# Patient Record
Sex: FEMALE | Race: ASIAN | HISPANIC OR LATINO | Employment: UNEMPLOYED | URBAN - METROPOLITAN AREA
[De-identification: names, ages, dates, MRNs, and addresses within clinical notes are randomized per-mention and may not be internally consistent; named-entity substitution may affect disease eponyms.]

---

## 2019-08-20 ENCOUNTER — OCCUPATIONAL HEALTH (OUTPATIENT)
Dept: URGENT CARE | Facility: CLINIC | Age: 29
End: 2019-08-20
Payer: MEDICAID

## 2019-08-20 DIAGNOSIS — Z02.83 ENCOUNTER FOR DRUG SCREENING: Primary | ICD-10-CM

## 2019-08-20 PROCEDURE — 80305 OOH COLLECTION ONLY DRUG SCREEN: ICD-10-PCS | Mod: S$GLB,,, | Performed by: PREVENTIVE MEDICINE

## 2019-08-20 PROCEDURE — 80305 DRUG TEST PRSMV DIR OPT OBS: CPT | Mod: S$GLB,,, | Performed by: PREVENTIVE MEDICINE

## 2020-01-22 ENCOUNTER — OFFICE VISIT (OUTPATIENT)
Dept: URGENT CARE | Facility: CLINIC | Age: 30
End: 2020-01-22
Payer: MEDICAID

## 2020-01-22 VITALS
SYSTOLIC BLOOD PRESSURE: 118 MMHG | HEIGHT: 60 IN | TEMPERATURE: 99 F | RESPIRATION RATE: 16 BRPM | HEART RATE: 104 BPM | DIASTOLIC BLOOD PRESSURE: 72 MMHG | BODY MASS INDEX: 27.48 KG/M2 | OXYGEN SATURATION: 98 % | WEIGHT: 140 LBS

## 2020-01-22 DIAGNOSIS — N30.00 ACUTE CYSTITIS WITHOUT HEMATURIA: ICD-10-CM

## 2020-01-22 DIAGNOSIS — R30.0 DYSURIA: Primary | ICD-10-CM

## 2020-01-22 LAB
B-HCG UR QL: NEGATIVE
BILIRUB UR QL STRIP: NEGATIVE
CTP QC/QA: YES
GLUCOSE UR QL STRIP: NEGATIVE
KETONES UR QL STRIP: NEGATIVE
LEUKOCYTE ESTERASE UR QL STRIP: POSITIVE
PH, POC UA: 8 (ref 5–8)
POC BLOOD, URINE: POSITIVE
POC NITRATES, URINE: NEGATIVE
PROT UR QL STRIP: NEGATIVE
SP GR UR STRIP: 1.01 (ref 1–1.03)
UROBILINOGEN UR STRIP-ACNC: NORMAL (ref 0.1–1.1)

## 2020-01-22 PROCEDURE — 99214 OFFICE O/P EST MOD 30 MIN: CPT | Mod: 25,S$GLB,, | Performed by: FAMILY MEDICINE

## 2020-01-22 PROCEDURE — 81003 URINALYSIS AUTO W/O SCOPE: CPT | Mod: QW,S$GLB,, | Performed by: FAMILY MEDICINE

## 2020-01-22 PROCEDURE — 81025 URINE PREGNANCY TEST: CPT | Mod: S$GLB,,, | Performed by: FAMILY MEDICINE

## 2020-01-22 PROCEDURE — 87086 URINE CULTURE/COLONY COUNT: CPT

## 2020-01-22 PROCEDURE — 81025 POCT URINE PREGNANCY: ICD-10-PCS | Mod: S$GLB,,, | Performed by: FAMILY MEDICINE

## 2020-01-22 PROCEDURE — 87186 SC STD MICRODIL/AGAR DIL: CPT

## 2020-01-22 PROCEDURE — 99214 PR OFFICE/OUTPT VISIT, EST, LEVL IV, 30-39 MIN: ICD-10-PCS | Mod: 25,S$GLB,, | Performed by: FAMILY MEDICINE

## 2020-01-22 PROCEDURE — 87077 CULTURE AEROBIC IDENTIFY: CPT

## 2020-01-22 PROCEDURE — 81003 POCT URINALYSIS, DIPSTICK, AUTOMATED, W/O SCOPE: ICD-10-PCS | Mod: QW,S$GLB,, | Performed by: FAMILY MEDICINE

## 2020-01-22 PROCEDURE — 87088 URINE BACTERIA CULTURE: CPT

## 2020-01-22 RX ORDER — CIPROFLOXACIN 500 MG/1
500 TABLET ORAL 2 TIMES DAILY
Qty: 14 TABLET | Refills: 0 | Status: SHIPPED | OUTPATIENT
Start: 2020-01-22 | End: 2020-01-29

## 2020-01-22 NOTE — PATIENT INSTRUCTIONS
PLEASE READ YOUR DISCHARGE INSTRUCTIONS ENTIRELY AS IT CONTAINS IMPORTANT INFORMATION.    Please return here or go to the Emergency Department for any concerns or worsening of condition.    If you were prescribed antibiotics, please take them to completion.      If not allergic, please take over the counter Tylenol (Acetaminophen) and/or Motrin (Ibuprofen) as directed for control of pain and/or fever.  Please follow up with your primary care doctor or specialist as needed.  Soak wound as discussed and apply warm compresses frequently    If you smoke, please stop smoking.    Please return or see your primary care doctor if you develop new or worsening symptoms.     Please arrange follow up with your primary medical clinic as soon as possible. You must understand that you've received an Urgent Care treatment only and that you may be released before all of your medical problems are known or treated. You, the patient, will arrange for follow up as instructed. If your symptoms worsen or fail to improve you should go to the Emergency Room.  Understanding Urinary Tract Infections (UTIs)  Most UTIs are caused by bacteria, although they may also be caused by viruses or fungi. Bacteria from the bowel are the most common source of infection. The infection may start because of any of the following:  · Sexual activity. During sex, bacteria can travel from the penis, vagina, or rectum into the urethra.   · Bacteria on the skin outside the rectum may travel into the urethra. This is more common in women since the rectum and urethra are closer to each other than in men. Wiping from front to back after using the toilet and keeping the area clean can help prevent germs from getting to the urethra.  · Blockage of urine flow through the urinary tract. If urine sits too long, germs may start to grow out of control.      Parts of the urinary tract  The infection can occur in any part of the urinary tract.  · The kidneys collect and store  "urine.  · The ureters carry urine from the kidneys to the bladder.  · The bladder holds urine until you are ready to let it out.  · The urethra carries urine from the bladder out of the body. It is shorter in women, so bacteria can move through it more easily. The urethra is longer in men, so a UTI is less likely to reach the bladder or kidneys in men.  Date Last Reviewed: 1/1/2017 © 2000-2017 [employer]. [address] [place] [address], [place] [address]. All rights reserved. This information is not intended as a substitute for professional medical care. Always follow your healthcare professional's instructions.        Dysuria     Painful urination (dysuria) is often caused by a problem in the urinary tract.   Dysuria is pain felt during urination. It is often described as a burning. Learn more about this problem and how it can be treated.  What causes dysuria?  Possible causes include:  · Infection with a bacteria or virus such as a urinary tract infection (UTI or a sexually transmitted infection (STI)  · Sensitivity or allergy to chemicals such as those found in lotions and other products  · Prostate or bladder problems  · Radiation therapy to the pelvic area  How is dysuria diagnosed?  Your healthcare provider will examine you. He or she will ask about your symptoms and health. After talking with you and doing a physical exam, your healthcare provider may know what is causing your dysuria. He or she will usually request  a sample of your urine. Tests of your urine, or a "urinalysis," are done. A urinalysis may include:  · Looking at the urine sample (visual exam)  · Checking for substances (chemical exam)  · Looking at a small amount under a microscope (microscopic exam)  Some parts of the urinalysis may be done in the provider's office and some in a lab. And, the urine sample may be checked for bacteria and yeast (urine culture). Your healthcare provider will tell you more about these tests if they are " needed.  How is dysuria treated?  Treatment depends on the cause. If you have a bacterial infection, you may need antibiotics. You may be given medicines to make it easier for you to urinate and help relieve pain. Your healthcare provider can tell you more about your treatment options. Untreated, symptoms may get worse.  When to call your healthcare provider  Call the healthcare provider right away if you have any of the following:  · Fever of 100.4°F (38°C) or higher   · No improvement after three days of treatment  · Trouble urinating because of pain  · New or increased discharge from the vagina or penis  · Rash or joint pain  · Increased back or abdominal pain  · Enlarged painful lymph nodes (lumps) in the groin   Date Last Reviewed: 1/1/2017  © 9257-1779 [employer]. [address] [place] [address], [place] [address]. All rights reserved. This information is not intended as a substitute for professional medical care. Always follow your healthcare professional's instructions.

## 2020-01-22 NOTE — PROGRESS NOTES
Subjective:       Patient ID: Ermelinda Trivedi is a 29 y.o. female.    Vitals:  height is 5' (1.524 m) and weight is 63.5 kg (140 lb). Her temperature is 98.8 °F (37.1 °C). Her blood pressure is 118/72 and her pulse is 104. Her respiration is 16 and oxygen saturation is 98%.     Chief Complaint: Urinary Tract Infection    29 years old female came with complaint of dysuria,  increased frequency and urgency for 1 day.  Denies fever, chills, abdominal pain, nausea/vomiting.    Urinary Tract Infection    This is a new problem. The current episode started yesterday. The problem occurs every urination. The problem has been unchanged. The quality of the pain is described as burning. The pain is at a severity of 2/10. The pain is mild. She is sexually active. There is no history of pyelonephritis. Associated symptoms include urgency. Pertinent negatives include no chills, frequency, hematuria, nausea, vomiting or rash. She has tried nothing for the symptoms.       Constitution: Negative for chills and fever.   Neck: Negative for painful lymph nodes.   Gastrointestinal: Negative for abdominal pain, nausea and vomiting.   Genitourinary: Positive for dysuria and urgency. Negative for frequency, urine decreased, hematuria, history of kidney stones, painful menstruation, irregular menstruation, missed menses, heavy menstrual bleeding, ovarian cysts, genital trauma, vaginal pain, vaginal discharge, vaginal bleeding, vaginal odor, painful intercourse, genital sore, painful ejaculation and pelvic pain.   Musculoskeletal: Negative for back pain.   Skin: Negative for rash and lesion.   Hematologic/Lymphatic: Negative for swollen lymph nodes.       Objective:      Physical Exam   Constitutional: She is oriented to person, place, and time. She appears well-developed and well-nourished.   HENT:   Head: Normocephalic and atraumatic.   Right Ear: External ear normal.   Left Ear: External ear normal.   Nose: Nose normal.   Mouth/Throat:  Mucous membranes are normal.   Eyes: Conjunctivae and lids are normal.   Neck: Trachea normal and full passive range of motion without pain. Neck supple.   Cardiovascular: Normal rate, regular rhythm and normal heart sounds.   Pulmonary/Chest: Effort normal and breath sounds normal. No stridor. No respiratory distress. She has no wheezes. She has no rales.   Abdominal: Soft. Normal appearance and bowel sounds are normal. She exhibits no distension, no abdominal bruit, no pulsatile midline mass and no mass. There is no tenderness. There is no rebound and no guarding.   No suprapubic or CVA tenderness.   Musculoskeletal: Normal range of motion. She exhibits no edema.   Neurological: She is alert and oriented to person, place, and time. She has normal strength.   Skin: Skin is warm, dry, intact, not diaphoretic and not pale.   Psychiatric: She has a normal mood and affect. Her speech is normal and behavior is normal. Judgment and thought content normal. Cognition and memory are normal.   Nursing note and vitals reviewed.        Assessment:       1. Dysuria        Plan:         Dysuria  -     POCT Urinalysis, Dipstick, Automated, W/O Scope  -     POCT urine pregnancy    PLEASE READ YOUR DISCHARGE INSTRUCTIONS ENTIRELY AS IT CONTAINS IMPORTANT INFORMATION.    Please return here or go to the Emergency Department for any concerns or worsening of condition.    If you were prescribed antibiotics, please take them to completion.      If not allergic, please take over the counter Tylenol (Acetaminophen) and/or Motrin (Ibuprofen) as directed for control of pain and/or fever.  Please follow up with your primary care doctor or specialist as needed.  Soak wound as discussed and apply warm compresses frequently    If you smoke, please stop smoking.    Please return or see your primary care doctor if you develop new or worsening symptoms.     Please arrange follow up with your primary medical clinic as soon as possible. You must  understand that you've received an Urgent Care treatment only and that you may be released before all of your medical problems are known or treated. You, the patient, will arrange for follow up as instructed. If your symptoms worsen or fail to improve you should go to the Emergency Room.  Understanding Urinary Tract Infections (UTIs)  Most UTIs are caused by bacteria, although they may also be caused by viruses or fungi. Bacteria from the bowel are the most common source of infection. The infection may start because of any of the following:  · Sexual activity. During sex, bacteria can travel from the penis, vagina, or rectum into the urethra.   · Bacteria on the skin outside the rectum may travel into the urethra. This is more common in women since the rectum and urethra are closer to each other than in men. Wiping from front to back after using the toilet and keeping the area clean can help prevent germs from getting to the urethra.  · Blockage of urine flow through the urinary tract. If urine sits too long, germs may start to grow out of control.      Parts of the urinary tract  The infection can occur in any part of the urinary tract.  · The kidneys collect and store urine.  · The ureters carry urine from the kidneys to the bladder.  · The bladder holds urine until you are ready to let it out.  · The urethra carries urine from the bladder out of the body. It is shorter in women, so bacteria can move through it more easily. The urethra is longer in men, so a UTI is less likely to reach the bladder or kidneys in men.  Date Last Reviewed: 1/1/2017  © 4247-2922 [employer]. [address] [place] [address], [place] [address]. All rights reserved. This information is not intended as a substitute for professional medical care. Always follow your healthcare professional's instructions.        Dysuria     Painful urination (dysuria) is often caused by a problem in the urinary tract.   Dysuria is pain felt during  "urination. It is often described as a burning. Learn more about this problem and how it can be treated.  What causes dysuria?  Possible causes include:  · Infection with a bacteria or virus such as a urinary tract infection (UTI or a sexually transmitted infection (STI)  · Sensitivity or allergy to chemicals such as those found in lotions and other products  · Prostate or bladder problems  · Radiation therapy to the pelvic area  How is dysuria diagnosed?  Your healthcare provider will examine you. He or she will ask about your symptoms and health. After talking with you and doing a physical exam, your healthcare provider may know what is causing your dysuria. He or she will usually request  a sample of your urine. Tests of your urine, or a "urinalysis," are done. A urinalysis may include:  · Looking at the urine sample (visual exam)  · Checking for substances (chemical exam)  · Looking at a small amount under a microscope (microscopic exam)  Some parts of the urinalysis may be done in the provider's office and some in a lab. And, the urine sample may be checked for bacteria and yeast (urine culture). Your healthcare provider will tell you more about these tests if they are needed.  How is dysuria treated?  Treatment depends on the cause. If you have a bacterial infection, you may need antibiotics. You may be given medicines to make it easier for you to urinate and help relieve pain. Your healthcare provider can tell you more about your treatment options. Untreated, symptoms may get worse.  When to call your healthcare provider  Call the healthcare provider right away if you have any of the following:  · Fever of 100.4°F (38°C) or higher   · No improvement after three days of treatment  · Trouble urinating because of pain  · New or increased discharge from the vagina or penis  · Rash or joint pain  · Increased back or abdominal pain  · Enlarged painful lymph nodes (lumps) in the groin   Date Last Reviewed: " 1/1/2017  © 9789-9915 [employer] [address] [place] [address], [place] [address]. All rights reserved. This information is not intended as a substitute for professional medical care. Always follow your healthcare professional's instructions.

## 2020-01-25 ENCOUNTER — TELEPHONE (OUTPATIENT)
Dept: URGENT CARE | Facility: CLINIC | Age: 30
End: 2020-01-25

## 2020-01-25 LAB — BACTERIA UR CULT: ABNORMAL

## 2020-01-25 NOTE — TELEPHONE ENCOUNTER
Called to discuss positive urine culture results of E.coli, treated appropriately with Cipro.  No answer, left VM.

## 2020-04-21 DIAGNOSIS — Z01.84 ANTIBODY RESPONSE EXAMINATION: ICD-10-CM

## 2020-05-21 DIAGNOSIS — Z01.84 ANTIBODY RESPONSE EXAMINATION: ICD-10-CM

## 2020-06-20 DIAGNOSIS — Z01.84 ANTIBODY RESPONSE EXAMINATION: ICD-10-CM

## 2020-07-20 DIAGNOSIS — Z01.84 ANTIBODY RESPONSE EXAMINATION: ICD-10-CM

## 2020-08-19 DIAGNOSIS — Z01.84 ANTIBODY RESPONSE EXAMINATION: ICD-10-CM

## 2020-09-18 DIAGNOSIS — Z01.84 ANTIBODY RESPONSE EXAMINATION: ICD-10-CM

## 2020-10-18 DIAGNOSIS — Z01.84 ANTIBODY RESPONSE EXAMINATION: ICD-10-CM

## 2020-10-19 ENCOUNTER — CLINICAL SUPPORT (OUTPATIENT)
Dept: URGENT CARE | Facility: CLINIC | Age: 30
End: 2020-10-19
Payer: MEDICAID

## 2020-10-19 DIAGNOSIS — Z23 IMMUNIZATION DUE: Primary | ICD-10-CM

## 2020-11-17 DIAGNOSIS — Z01.84 ANTIBODY RESPONSE EXAMINATION: ICD-10-CM

## 2022-04-05 ENCOUNTER — OFFICE VISIT (OUTPATIENT)
Dept: URGENT CARE | Facility: CLINIC | Age: 32
End: 2022-04-05
Payer: COMMERCIAL

## 2022-04-05 VITALS
RESPIRATION RATE: 16 BRPM | BODY MASS INDEX: 33.17 KG/M2 | OXYGEN SATURATION: 97 % | HEART RATE: 93 BPM | HEIGHT: 58 IN | SYSTOLIC BLOOD PRESSURE: 130 MMHG | WEIGHT: 158 LBS | TEMPERATURE: 99 F | DIASTOLIC BLOOD PRESSURE: 76 MMHG

## 2022-04-05 DIAGNOSIS — L23.5 CHEMICAL INDUCED ALLERGIC CONTACT DERMATITIS: Primary | ICD-10-CM

## 2022-04-05 LAB
CTP QC/QA: YES
POC 10 PANEL DRUG SCREEN: NEGATIVE

## 2022-04-05 PROCEDURE — 80305 POCT RAPID DRUG SCREEN 10 PANEL: ICD-10-PCS | Mod: S$GLB,,, | Performed by: EMERGENCY MEDICINE

## 2022-04-05 PROCEDURE — 99213 PR OFFICE/OUTPT VISIT, EST, LEVL III, 20-29 MIN: ICD-10-PCS | Mod: S$GLB,,, | Performed by: EMERGENCY MEDICINE

## 2022-04-05 PROCEDURE — 99213 OFFICE O/P EST LOW 20 MIN: CPT | Mod: S$GLB,,, | Performed by: EMERGENCY MEDICINE

## 2022-04-05 PROCEDURE — 80305 DRUG TEST PRSMV DIR OPT OBS: CPT | Mod: S$GLB,,, | Performed by: EMERGENCY MEDICINE

## 2022-04-05 RX ORDER — COVID-19 MOLECULAR TEST ASSAY
KIT MISCELLANEOUS
COMMUNITY
Start: 2022-03-07

## 2022-04-05 RX ORDER — HYDROCORTISONE 25 MG/G
CREAM TOPICAL 2 TIMES DAILY
Qty: 28 G | Refills: 0 | Status: SHIPPED | OUTPATIENT
Start: 2022-04-05 | End: 2022-04-12

## 2022-04-05 NOTE — LETTER
[facility]  [address] [address]  Phone: [phone]  Fax: [phone]  [employer]: [phone]    Pt Name: Ermelinda Soto Date: 04/05/2022   Employee ID: [id number] Date of First Treatment: 04/05/2022   Company: [employer]      Appointment Time: 01:20 PM Arrived: 1:33pm   Provider: Arnaldo Tim MD Time Out: 3:15pm     Office Treatment:   1. Chemical induced allergic contact dermatitis      Medications Ordered This Encounter   Medications    hydrocortisone 2.5 % cream      Patient Instructions: Attention not to aggravate affected area      Restrictions: Regular Duty, Discharged from Occupational Health     Return if symptoms worsen or fail to improve.

## 2022-04-05 NOTE — PROGRESS NOTES
Subjective:       Patient ID: Ermelinda Trivedi is a 31 y.o. female.    Chief Complaint: Chemical Exposure (LT Hand)    [facility] Chemical Exposure to LT Hand ( DOI 04-05-22 ) While at work, she was excepting a tissue sample being put into a Formalin jar when formalin splashed onto her LT Hand. Pain score is 0/10 with No complaints, LT Hand was Itchy for awhile but isn't now, No swelling, No redness. 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  She was not wearing gloves at the time.  She reports washing her hands afterwards and then feeling a itchy sensation to the left hand.  She also reported very mild redness.  The symptoms resolved and she washed her hands again.  She reports the itchiness came back once.  No shortness of breath no hives no diffuse reaction.  For hours the symptoms have been resolved and she has full range of motion.  Physical exam shows no rash or irritation or skin breakdown.  I have instructed her to take Claritin as needed and hydrocortisone cream if the rash returns or itching sensation returns.  She may return p.r.n. however discharged from occupational health without restrictions to return to regular duty tomorrow.      ros    Constitution: Negative for chills, fatigue and fever.   HENT: Negative for ear pain, sinus pain and sore throat.    Neck: Negative for neck pain and neck stiffness.   Cardiovascular: Negative for chest pain, palpitations and sob on exertion.   Eyes: Negative for eye pain and vision loss.   Respiratory: Negative for cough, shortness of breath and asthma.    Gastrointestinal: Negative for abdominal pain, nausea, vomiting and diarrhea.   Genitourinary: Negative for dysuria, frequency and hematuria.   Musculoskeletal: Negative for pain, trauma, joint pain, joint swelling, abnormal ROM of joint, back pain and muscle ache.   Skin: Positive for rash.  Negative for color change, wound, erythema and bruising.   Allergic/Immunologic: Negative for seasonal allergies and asthma.   Neurological: Negative for dizziness, light-headedness, altered mental status, numbness and tingling.   Psychiatric/Behavioral: Negative for altered mental status and confusion.        Objective:      Physical Exam  Vitals and nursing note reviewed.   Constitutional:       Appearance: She is well-developed.   HENT:      Head: Normocephalic and atraumatic. No abrasion, contusion or laceration.      Right Ear: External ear normal.      Left Ear: External ear normal.      Nose: Nose normal.   Eyes:      General: Lids are normal.      Conjunctiva/sclera: Conjunctivae normal.      Pupils: Pupils are equal, round, and reactive to light.   Neck:      Trachea: Trachea and phonation normal.   Cardiovascular:      Rate and Rhythm: Normal rate and regular rhythm.      Heart sounds: Normal heart sounds.   Pulmonary:      Effort: Pulmonary effort is normal. No respiratory distress.      Breath sounds: Normal breath sounds. No stridor.   Musculoskeletal:         General: Normal range of motion.      Cervical back: Full passive range of motion without pain and neck supple.   Skin:     General: Skin is warm and dry.      Capillary Refill: Capillary refill takes less than 2 seconds.      Findings: No abrasion, bruising, burn, ecchymosis, erythema, laceration, lesion or rash.      Comments: Currently examination of the left hand shows no hives, no erythema no skin breakdown or evidence of dermatitis.  Consistent with mild chemical dermatitis that has resolved.  Normal range of motion and distally neurovascularly intact.   Neurological:      Mental Status: She is alert and oriented to person, place, and time.   Psychiatric:         Speech: Speech normal.         Behavior: Behavior normal.         Thought Content: Thought content normal.         Judgment: Judgment normal.         Assessment:       1. 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  She was not wearing gloves at the time.  She reports washing her hands afterwards and then feeling a itchy sensation to the left hand.  She also reported very mild redness.  The symptoms resolved and she washed her hands again.  She reports the itchiness came back once.  No shortness of breath no hives no diffuse reaction.  For hours the symptoms have been resolved and she has full range of motion.  Physical exam shows no rash or irritation or skin breakdown.  I have instructed her to take Claritin as needed and hydrocortisone cream if the rash returns or itching sensation returns.  She may return p.r.n. however discharged from occupational health without restrictions to return to regular duty tomorrow.         Patient Instructions: Attention not to aggravate affected area   Restrictions: Regular Duty, Discharged from Occupational Health  Follow up in about 1 year (around 4/5/2023), or if symptoms worsen or fail to improve.

## 2022-05-19 ENCOUNTER — HOSPITAL ENCOUNTER (EMERGENCY)
Facility: HOSPITAL | Age: 32
Discharge: HOME | End: 2022-05-19
Attending: INTERNAL MEDICINE
Payer: MEDICAID

## 2022-05-19 VITALS
DIASTOLIC BLOOD PRESSURE: 74 MMHG | SYSTOLIC BLOOD PRESSURE: 111 MMHG | HEART RATE: 91 BPM | BODY MASS INDEX: 33.17 KG/M2 | OXYGEN SATURATION: 96 % | TEMPERATURE: 99 F | HEIGHT: 58 IN | RESPIRATION RATE: 20 BRPM | WEIGHT: 158 LBS

## 2022-05-19 DIAGNOSIS — J06.9 VIRAL URI WITH COUGH: Primary | ICD-10-CM

## 2022-05-19 DIAGNOSIS — R00.0 RACING HEART BEAT: ICD-10-CM

## 2022-05-19 LAB
B-HCG UR QL: NEGATIVE
CTP QC/QA: YES
INFLUENZA A ANTIGEN, POC: NEGATIVE
INFLUENZA B ANTIGEN, POC: NEGATIVE

## 2022-05-19 PROCEDURE — 93010 ELECTROCARDIOGRAM REPORT: CPT | Mod: ,,, | Performed by: INTERNAL MEDICINE

## 2022-05-19 PROCEDURE — 99284 EMERGENCY DEPT VISIT MOD MDM: CPT | Mod: 25,ER

## 2022-05-19 PROCEDURE — U0005 INFEC AGEN DETEC AMPLI PROBE: HCPCS | Performed by: NURSE PRACTITIONER

## 2022-05-19 PROCEDURE — 93005 ELECTROCARDIOGRAM TRACING: CPT | Mod: ER

## 2022-05-19 PROCEDURE — U0003 INFECTIOUS AGENT DETECTION BY NUCLEIC ACID (DNA OR RNA); SEVERE ACUTE RESPIRATORY SYNDROME CORONAVIRUS 2 (SARS-COV-2) (CORONAVIRUS DISEASE [COVID-19]), AMPLIFIED PROBE TECHNIQUE, MAKING USE OF HIGH THROUGHPUT TECHNOLOGIES AS DESCRIBED BY CMS-2020-01-R: HCPCS | Performed by: NURSE PRACTITIONER

## 2022-05-19 PROCEDURE — 93010 EKG 12-LEAD: ICD-10-PCS | Mod: ,,, | Performed by: INTERNAL MEDICINE

## 2022-05-19 PROCEDURE — 25000003 PHARM REV CODE 250: Mod: ER | Performed by: INTERNAL MEDICINE

## 2022-05-19 PROCEDURE — 87804 INFLUENZA ASSAY W/OPTIC: CPT | Mod: 59,ER

## 2022-05-19 PROCEDURE — 81025 URINE PREGNANCY TEST: CPT | Mod: ER | Performed by: INTERNAL MEDICINE

## 2022-05-19 RX ORDER — IBUPROFEN 600 MG/1
600 TABLET ORAL
Status: COMPLETED | OUTPATIENT
Start: 2022-05-19 | End: 2022-05-19

## 2022-05-19 RX ORDER — ACETAMINOPHEN 500 MG
1000 TABLET ORAL
Status: COMPLETED | OUTPATIENT
Start: 2022-05-19 | End: 2022-05-19

## 2022-05-19 RX ORDER — FLUTICASONE PROPIONATE 50 MCG
2 SPRAY, SUSPENSION (ML) NASAL DAILY
Qty: 15 G | Refills: 0 | Status: SHIPPED | OUTPATIENT
Start: 2022-05-19

## 2022-05-19 RX ORDER — IBUPROFEN 600 MG/1
600 TABLET ORAL 3 TIMES DAILY
Qty: 30 TABLET | Refills: 0 | Status: SHIPPED | OUTPATIENT
Start: 2022-05-19

## 2022-05-19 RX ORDER — AZELASTINE 1 MG/ML
2 SPRAY, METERED NASAL 2 TIMES DAILY
Qty: 30 ML | Refills: 0 | Status: SHIPPED | OUTPATIENT
Start: 2022-05-19 | End: 2022-07-13

## 2022-05-19 RX ADMIN — ACETAMINOPHEN 1000 MG: 500 TABLET ORAL at 07:05

## 2022-05-19 RX ADMIN — IBUPROFEN 600 MG: 600 TABLET ORAL at 08:05

## 2022-05-20 LAB — SARS-COV-2 RNA RESP QL NAA+PROBE: DETECTED

## 2022-05-20 NOTE — ED PROVIDER NOTES
Encounter Date: 5/19/2022    SCRIBE #1 NOTE: I, Yaz Neal, am scribing for, and in the presence of,  Stan Koch MD. I have scribed the following portions of the note - Other sections scribed: HPI, ROS, PE.       History     Chief Complaint   Patient presents with    Generalized Body Aches     Pt c/o generalized body aches, fever, and rapid heart rate since yesterday     Ermelinda Trivedi is a 31 y.o. female who presents to the ED for evaluation of fever onset yesterday. Pt reports that her at home fever was between 101-102 F each time she checked it. She also complains of her heart rate being around 130 bpm, myalgias, fever, chills, dry cough, and congestion. Pt denies being vaccinated against Covid-19. NKDA.    The history is provided by the patient. No  was used.     Review of patient's allergies indicates:  No Known Allergies  History reviewed. No pertinent past medical history.  History reviewed. No pertinent surgical history.  Family History   Problem Relation Age of Onset    Hypertension Mother     Hypertension Father      Social History     Tobacco Use    Smoking status: Never Smoker    Smokeless tobacco: Never Used   Substance Use Topics    Alcohol use: Never    Drug use: Never     Review of Systems   Constitutional: Positive for chills and fever.   HENT: Positive for congestion. Negative for sore throat.    Eyes: Negative for pain.   Respiratory: Positive for cough. Negative for shortness of breath.    Cardiovascular: Negative for chest pain.   Gastrointestinal: Negative for abdominal pain and nausea.   Genitourinary: Negative for dysuria.   Musculoskeletal: Positive for myalgias. Negative for back pain.   Skin: Negative for rash.   Neurological: Negative for weakness.   All other systems reviewed and are negative.      Physical Exam     Initial Vitals [05/19/22 1906]   BP Pulse Resp Temp SpO2   (!) 147/91 (!) 123 20 100.3 °F (37.9 °C) 97 %      MAP       --          Physical Exam    Nursing note and vitals reviewed.  Constitutional: She appears well-developed and well-nourished.   HENT:   Head: Normocephalic and atraumatic.   Enlarged nasal turbinates noted. Clear nasal discharge noted. Oropharyngeal erythema present. No oropharyngeal exudate or edema.    Eyes: Conjunctivae are normal.   Neck: Neck supple.   Normal range of motion.  Cardiovascular: Normal rate, regular rhythm and normal heart sounds. Exam reveals no gallop and no friction rub.    No murmur heard.  Pulmonary/Chest: Breath sounds normal. No respiratory distress. She has no wheezes. She has no rhonchi. She has no rales.   Abdominal: Abdomen is soft. There is no abdominal tenderness.   Musculoskeletal:         General: No edema. Normal range of motion.      Cervical back: Normal range of motion and neck supple.     Neurological: She is alert and oriented to person, place, and time. GCS score is 15. GCS eye subscore is 4. GCS verbal subscore is 5. GCS motor subscore is 6.   Skin: Skin is warm and dry.   Psychiatric: She has a normal mood and affect.         ED Course   Procedures  Labs Reviewed   SARS-COV-2 (COVID-19) QUALITATIVE PCR - Abnormal; Notable for the following components:       Result Value    SARS-CoV2 (COVID-19) Qualitative PCR Detected (*)     All other components within normal limits    Narrative:     Is testing needed for patient travel?->No  Is this needed for pre-procedure or pre-op testing?->No   POCT URINE PREGNANCY   POCT RAPID INFLUENZA A/B          Imaging Results    None          Medications   acetaminophen tablet 1,000 mg (1,000 mg Oral Given 5/19/22 1912)   ibuprofen tablet 600 mg (600 mg Oral Given 5/19/22 2050)     Medical Decision Making:   History:   Old Medical Records: I decided to obtain old medical records.  Initial Assessment:   Ermelinda Trivedi is a 31 y.o. female who presents to the ED for evaluation of fever onset yesterday. Pt reports that her at home fever was between  101-102 F each time she checked it. She also complains of her heart rate being around 130 bpm, myalgias, fever, chills, dry cough, and congestion. Pt denies being vaccinated against Covid-19. NKDA.  Clinical Tests:   Lab Tests: Ordered and Reviewed  The following lab test(s) were unremarkable: UPT  ED Management:  COVID-19 screen was positive.  Patient was given instructions for COVID-19 and received Tylenol/ibuprofen in the emergency department as well as prescriptions for Astelin/fluticasone/ibuprofen..  Patient's fever resolved prior to discharge and she was advised to follow-up with her primary care physician within the next week for re-evaluation/return to the emergency department if condition worsens.          Scribe Attestation:   Scribe #1: I performed the above scribed service and the documentation accurately describes the services I performed. I attest to the accuracy of the note.               This document was produced by a scribe under my direction and in my presence. I agree with the content of the note and have made any necessary edits.     Dr. Koch    05/21/2022 5:16 AM    Clinical Impression:   Final diagnoses:  [R00.0] Racing heart beat  [J06.9] Viral URI with cough (Primary)          ED Disposition Condition    Discharge Stable        ED Prescriptions     Medication Sig Dispense Start Date End Date Auth. Provider    azelastine (ASTELIN) 137 mcg (0.1 %) nasal spray 2 sprays (274 mcg total) by Nasal route 2 (two) times daily. 30 mL 5/19/2022 7/13/2022 Stan Koch MD    fluticasone propionate (FLONASE) 50 mcg/actuation nasal spray 2 sprays (100 mcg total) by Each Nostril route once daily. 15 g 5/19/2022  Stan Koch MD    ibuprofen (ADVIL,MOTRIN) 600 MG tablet Take 1 tablet (600 mg total) by mouth 3 (three) times daily. 30 tablet 5/19/2022  Stan Koch MD        Follow-up Information    None          Stan Koch MD  05/21/22 4773

## 2022-05-20 NOTE — FIRST PROVIDER EVALUATION
Emergency Department TeleTriage Encounter Note      CHIEF COMPLAINT    Chief Complaint   Patient presents with    Generalized Body Aches     Pt c/o generalized body aches, fever, and rapid heart rate since yesterday       VITAL SIGNS   Initial Vitals [05/19/22 1906]   BP Pulse Resp Temp SpO2   (!) 147/91 (!) 123 20 100.3 °F (37.9 °C) 97 %      MAP       --            ALLERGIES    Review of patient's allergies indicates:  No Known Allergies    PROVIDER TRIAGE NOTE  This is a teletriage evaluation of a 31 y.o. female presenting to the ED with c/o fever, body aches, and heart racing sensation. Limited physical exam via telehealth: The patient is awake, alert, answering questions appropriately and is not in respiratory distress. Initial orders will be placed and care will be transferred to an alternate provider when patient is roomed for a full evaluation. Any additional orders and the final disposition will be determined by that provider.         ORDERS  Labs Reviewed   POCT URINE PREGNANCY   POCT INFLUENZA A/B MOLECULAR   POCT RAPID INFLUENZA A/B       ED Orders (720h ago, onward)    Start Ordered     Status Ordering Provider    05/19/22 1950 05/19/22 1950  EKG 12-lead  Once         Ordered QUAN BARRERA    05/19/22 1950 05/19/22 1950  COVID-19 Routine Screening  STAT         Ordered QUAN BARRERA    05/19/22 1920 05/19/22 1920  POCT Rapid Influenza A/B  Once         Final result EMERGENCY, DEPT PHYSICIAN    05/19/22 1915 05/19/22 1909  acetaminophen tablet 1,000 mg  ED 1 Time         Last MAR action: Given - by NATALI CASTANEDA on 05/19/22 at 1912 AGNES RESENDEZ    05/19/22 1910 05/19/22 1909  POCT Influenza A/B Molecular  Once         Acknowledged AGNES RESENDEZ    05/19/22 1910 05/19/22 1909  POCT urine pregnancy  Once         Final result AGNES RESENDEZ            Virtual Visit Note: The provider triage portion of this emergency department evaluation and documentation was performed via  [employer], a HIPAA-compliant telemedicine application, in concert with a tele-presenter in the room. A face to face patient evaluation with one of my colleagues will occur once the patient is placed in an emergency department room.      DISCLAIMER: This note was prepared with [employer] voice recognition transcription software. Garbled syntax, mangled pronouns, and other bizarre constructions may be attributed to that software system.

## 2022-11-22 ENCOUNTER — TELEPHONE (OUTPATIENT)
Dept: INFECTIOUS DISEASES | Facility: CLINIC | Age: 32
End: 2022-11-22
Payer: MEDICAID

## 2023-03-28 ENCOUNTER — HOSPITAL ENCOUNTER (EMERGENCY)
Facility: HOSPITAL | Age: 33
Discharge: HOME | End: 2023-03-28
Attending: EMERGENCY MEDICINE
Payer: MEDICAID

## 2023-03-28 VITALS
SYSTOLIC BLOOD PRESSURE: 135 MMHG | HEART RATE: 115 BPM | OXYGEN SATURATION: 98 % | DIASTOLIC BLOOD PRESSURE: 76 MMHG | TEMPERATURE: 99 F | RESPIRATION RATE: 16 BRPM

## 2023-03-28 DIAGNOSIS — R19.7 DIARRHEA, UNSPECIFIED TYPE: ICD-10-CM

## 2023-03-28 DIAGNOSIS — R11.2 NAUSEA AND VOMITING, UNSPECIFIED VOMITING TYPE: Primary | ICD-10-CM

## 2023-03-28 LAB
ALBUMIN SERPL BCP-MCNC: 4.5 G/DL (ref 3.5–5.2)
ALP SERPL-CCNC: 80 U/L (ref 55–135)
ALT SERPL W/O P-5'-P-CCNC: 31 U/L (ref 10–44)
ANION GAP SERPL CALC-SCNC: 12 MMOL/L (ref 8–16)
AST SERPL-CCNC: 28 U/L (ref 10–40)
B-HCG UR QL: NEGATIVE
BACTERIA #/AREA URNS AUTO: NORMAL /HPF
BASOPHILS # BLD AUTO: 0.04 K/UL (ref 0–0.2)
BASOPHILS NFR BLD: 0.3 % (ref 0–1.9)
BILIRUB SERPL-MCNC: 0.5 MG/DL (ref 0.1–1)
BILIRUB UR QL STRIP: NEGATIVE
BUN SERPL-MCNC: 11 MG/DL (ref 6–20)
C DIFF GDH STL QL: NEGATIVE
C DIFF TOX A+B STL QL IA: NEGATIVE
CALCIUM SERPL-MCNC: 9.7 MG/DL (ref 8.7–10.5)
CHLORIDE SERPL-SCNC: 106 MMOL/L (ref 95–110)
CLARITY UR REFRACT.AUTO: CLEAR
CO2 SERPL-SCNC: 21 MMOL/L (ref 23–29)
COLOR UR AUTO: YELLOW
CREAT SERPL-MCNC: 0.7 MG/DL (ref 0.5–1.4)
CTP QC/QA: YES
DIFFERENTIAL METHOD: ABNORMAL
EOSINOPHIL # BLD AUTO: 0.1 K/UL (ref 0–0.5)
EOSINOPHIL NFR BLD: 0.3 % (ref 0–8)
ERYTHROCYTE [DISTWIDTH] IN BLOOD BY AUTOMATED COUNT: 13.2 % (ref 11.5–14.5)
EST. GFR  (NO RACE VARIABLE): >60 ML/MIN/1.73 M^2
GLUCOSE SERPL-MCNC: 112 MG/DL (ref 70–110)
GLUCOSE UR QL STRIP: NEGATIVE
HCT VFR BLD AUTO: 39.7 % (ref 37–48.5)
HCV AB SERPL QL IA: NORMAL
HGB BLD-MCNC: 12.6 G/DL (ref 12–16)
HGB UR QL STRIP: ABNORMAL
HIV 1+2 AB+HIV1 P24 AG SERPL QL IA: NORMAL
IMM GRANULOCYTES # BLD AUTO: 0.11 K/UL (ref 0–0.04)
IMM GRANULOCYTES NFR BLD AUTO: 0.7 % (ref 0–0.5)
KETONES UR QL STRIP: NEGATIVE
LEUKOCYTE ESTERASE UR QL STRIP: NEGATIVE
LIPASE SERPL-CCNC: 15 U/L (ref 4–60)
LYMPHOCYTES # BLD AUTO: 0.8 K/UL (ref 1–4.8)
LYMPHOCYTES NFR BLD: 5.1 % (ref 18–48)
MCH RBC QN AUTO: 27.7 PG (ref 27–31)
MCHC RBC AUTO-ENTMCNC: 31.7 G/DL (ref 32–36)
MCV RBC AUTO: 87 FL (ref 82–98)
MICROSCOPIC COMMENT: NORMAL
MONOCYTES # BLD AUTO: 0.7 K/UL (ref 0.3–1)
MONOCYTES NFR BLD: 4.5 % (ref 4–15)
NEUTROPHILS # BLD AUTO: 13.3 K/UL (ref 1.8–7.7)
NEUTROPHILS NFR BLD: 89.1 % (ref 38–73)
NITRITE UR QL STRIP: NEGATIVE
NRBC BLD-RTO: 0 /100 WBC
PH UR STRIP: 6 [PH] (ref 5–8)
PLATELET # BLD AUTO: 272 K/UL (ref 150–450)
PMV BLD AUTO: 11 FL (ref 9.2–12.9)
POTASSIUM SERPL-SCNC: 4.6 MMOL/L (ref 3.5–5.1)
PROT SERPL-MCNC: 8.2 G/DL (ref 6–8.4)
PROT UR QL STRIP: NEGATIVE
RBC # BLD AUTO: 4.55 M/UL (ref 4–5.4)
RBC #/AREA URNS AUTO: 4 /HPF (ref 0–4)
SARS-COV-2 RDRP RESP QL NAA+PROBE: NEGATIVE
SODIUM SERPL-SCNC: 139 MMOL/L (ref 136–145)
SP GR UR STRIP: 1.02 (ref 1–1.03)
SQUAMOUS #/AREA URNS AUTO: 1 /HPF
URN SPEC COLLECT METH UR: ABNORMAL
WBC # BLD AUTO: 14.96 K/UL (ref 3.9–12.7)
WBC #/AREA URNS AUTO: 0 /HPF (ref 0–5)

## 2023-03-28 PROCEDURE — 87046 STOOL CULTR AEROBIC BACT EA: CPT | Performed by: EMERGENCY MEDICINE

## 2023-03-28 PROCEDURE — 99284 EMERGENCY DEPT VISIT MOD MDM: CPT | Mod: 25

## 2023-03-28 PROCEDURE — 81025 URINE PREGNANCY TEST: CPT | Performed by: EMERGENCY MEDICINE

## 2023-03-28 PROCEDURE — 87045 FECES CULTURE AEROBIC BACT: CPT | Performed by: EMERGENCY MEDICINE

## 2023-03-28 PROCEDURE — 25000003 PHARM REV CODE 250: Performed by: EMERGENCY MEDICINE

## 2023-03-28 PROCEDURE — 85025 COMPLETE CBC W/AUTO DIFF WBC: CPT | Performed by: EMERGENCY MEDICINE

## 2023-03-28 PROCEDURE — 87449 NOS EACH ORGANISM AG IA: CPT | Mod: 91 | Performed by: EMERGENCY MEDICINE

## 2023-03-28 PROCEDURE — 87389 HIV-1 AG W/HIV-1&-2 AB AG IA: CPT | Performed by: PHYSICIAN ASSISTANT

## 2023-03-28 PROCEDURE — U0002 COVID-19 LAB TEST NON-CDC: HCPCS | Performed by: EMERGENCY MEDICINE

## 2023-03-28 PROCEDURE — 96375 TX/PRO/DX INJ NEW DRUG ADDON: CPT

## 2023-03-28 PROCEDURE — 80053 COMPREHEN METABOLIC PANEL: CPT | Performed by: EMERGENCY MEDICINE

## 2023-03-28 PROCEDURE — 99284 PR EMERGENCY DEPT VISIT,LEVEL IV: ICD-10-PCS | Mod: ,,, | Performed by: EMERGENCY MEDICINE

## 2023-03-28 PROCEDURE — 87449 NOS EACH ORGANISM AG IA: CPT | Performed by: EMERGENCY MEDICINE

## 2023-03-28 PROCEDURE — 63600175 PHARM REV CODE 636 W HCPCS: Performed by: EMERGENCY MEDICINE

## 2023-03-28 PROCEDURE — 87427 SHIGA-LIKE TOXIN AG IA: CPT | Mod: 59 | Performed by: EMERGENCY MEDICINE

## 2023-03-28 PROCEDURE — 99284 EMERGENCY DEPT VISIT MOD MDM: CPT | Mod: ,,, | Performed by: EMERGENCY MEDICINE

## 2023-03-28 PROCEDURE — 86803 HEPATITIS C AB TEST: CPT | Performed by: PHYSICIAN ASSISTANT

## 2023-03-28 PROCEDURE — 83690 ASSAY OF LIPASE: CPT | Performed by: EMERGENCY MEDICINE

## 2023-03-28 PROCEDURE — 96361 HYDRATE IV INFUSION ADD-ON: CPT

## 2023-03-28 PROCEDURE — 96374 THER/PROPH/DIAG INJ IV PUSH: CPT

## 2023-03-28 PROCEDURE — 81001 URINALYSIS AUTO W/SCOPE: CPT | Performed by: EMERGENCY MEDICINE

## 2023-03-28 RX ORDER — KETOROLAC TROMETHAMINE 30 MG/ML
10 INJECTION, SOLUTION INTRAMUSCULAR; INTRAVENOUS
Status: COMPLETED | OUTPATIENT
Start: 2023-03-28 | End: 2023-03-28

## 2023-03-28 RX ORDER — ONDANSETRON 2 MG/ML
4 INJECTION INTRAMUSCULAR; INTRAVENOUS
Status: COMPLETED | OUTPATIENT
Start: 2023-03-28 | End: 2023-03-28

## 2023-03-28 RX ORDER — DICYCLOMINE HYDROCHLORIDE 10 MG/1
20 CAPSULE ORAL
Status: COMPLETED | OUTPATIENT
Start: 2023-03-28 | End: 2023-03-28

## 2023-03-28 RX ORDER — ONDANSETRON 4 MG/1
4 TABLET, ORALLY DISINTEGRATING ORAL EVERY 8 HOURS PRN
Qty: 10 TABLET | Refills: 0 | Status: SHIPPED | OUTPATIENT
Start: 2023-03-28

## 2023-03-28 RX ORDER — DIPHENOXYLATE HYDROCHLORIDE AND ATROPINE SULFATE 2.5; .025 MG/1; MG/1
2 TABLET ORAL 3 TIMES DAILY PRN
Qty: 12 TABLET | Refills: 0 | Status: SHIPPED | OUTPATIENT
Start: 2023-03-28 | End: 2023-04-07

## 2023-03-28 RX ADMIN — DICYCLOMINE HYDROCHLORIDE 20 MG: 10 CAPSULE ORAL at 02:03

## 2023-03-28 RX ADMIN — SODIUM CHLORIDE 1000 ML: 9 INJECTION, SOLUTION INTRAVENOUS at 01:03

## 2023-03-28 RX ADMIN — ONDANSETRON 4 MG: 2 INJECTION INTRAMUSCULAR; INTRAVENOUS at 12:03

## 2023-03-28 RX ADMIN — KETOROLAC TROMETHAMINE 10 MG: 30 INJECTION, SOLUTION INTRAMUSCULAR; INTRAVENOUS at 02:03

## 2023-03-28 NOTE — Clinical Note
"Ermelinda Winkler" Shikha was seen and treated in our emergency department on 3/28/2023.  She may return to work on 03/30/2023.       If you have any questions or concerns, please don't hesitate to call.      Theresa Garcia RN    "

## 2023-03-28 NOTE — DISCHARGE INSTRUCTIONS
You likely have food poisoning or viral gastroenteritis.  I have prescribed you nausea medication and medication for diarrhea. I recommend a bland diet and small frequent sips of water to stay hydrated.    You can take Tylenol 650 mg every 6 hours for pain.    Seek immediate medical attention if you have persistence of your symptoms, fever, severe abdominal pain, for any other concern.

## 2023-03-28 NOTE — ED TRIAGE NOTES
Ermelinda Trivedi, a 32 y.o. female presents to the ED w/ complaint of nausea/vomiting/weakness/chills. Unable to tolerate oral intake    Triage note:  Chief Complaint   Patient presents with    Nausea     Intermittent abdominal pain with nausea, vomiting and diarrhea since this morning. Now feeling weak and dizzy. +chills.      Review of patient's allergies indicates:  No Known Allergies  History reviewed. No pertinent past medical history.

## 2023-03-29 LAB
E COLI SXT1 STL QL IA: NEGATIVE
E COLI SXT2 STL QL IA: NEGATIVE

## 2023-03-31 LAB — BACTERIA STL CULT: NORMAL

## 2023-04-01 NOTE — ED PROVIDER NOTES
Encounter Date: 3/28/2023       History     Chief Complaint   Patient presents with    Nausea     Intermittent abdominal pain with nausea, vomiting and diarrhea since this morning. Now feeling weak and dizzy. +chills.      Pt is a 31 yo F With no significant PMH who presented with abdominal pain, nausea, vomiting, diarrhea, chills. She is not sure if it is something she ate. 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 She has been having frequent diarrhea, initially loose stool, then watery but it is non-bloody. No sick family members.    The history is provided by the patient.   Review of patient's allergies indicates:  No Known Allergies  History reviewed. No pertinent past medical history.  History reviewed. No pertinent surgical history.  Family History   Problem Relation Age of Onset    Hypertension Mother     Hypertension Father      Social History     Tobacco Use    Smoking status: Never    Smokeless tobacco: Never   Substance Use Topics    Alcohol use: Never    Drug use: Never       Physical Exam     Initial Vitals [03/28/23 0009]   BP Pulse Resp Temp SpO2   125/79 79 18 99.9 °F (37.7 °C) 100 %      MAP       --         Physical Exam    Nursing note and vitals reviewed.  Constitutional: She appears well-developed and well-nourished. She is not diaphoretic. No distress.   HENT:   Head: Normocephalic and atraumatic.   Eyes: Conjunctivae and EOM are normal.   Neck: Neck supple.   Normal range of motion.  Cardiovascular:  Regular rhythm and intact distal pulses.           tachycardic   Pulmonary/Chest: No respiratory distress.   Abdominal: Abdomen is soft. She exhibits no distension. There is no abdominal tenderness. There is no rebound and no guarding.   Musculoskeletal:         General: Normal range of motion.      Cervical back: Normal range of motion and neck supple.     Neurological: She is alert and oriented to person, place, and time. GCS score is 15. GCS eye subscore is 4. GCS verbal subscore  is 5. GCS motor subscore is 6.   Skin: Skin is warm and dry.   Psychiatric: She has a normal mood and affect. Her behavior is normal. Judgment and thought content normal.       ED Course   Procedures  Labs Reviewed   URINALYSIS, REFLEX TO URINE CULTURE - Abnormal; Notable for the following components:       Result Value    Occult Blood UA 2+ (*)     All other components within normal limits    Narrative:     Specimen Source->Urine   COMPREHENSIVE METABOLIC PANEL - Abnormal; Notable for the following components:    CO2 21 (*)     Glucose 112 (*)     All other components within normal limits    Narrative:     Release to patient->Immediate   CBC W/ AUTO DIFFERENTIAL - Abnormal; Notable for the following components:    WBC 14.96 (*)     MCHC 31.7 (*)     Immature Granulocytes 0.7 (*)     Gran # (ANC) 13.3 (*)     Immature Grans (Abs) 0.11 (*)     Lymph # 0.8 (*)     Gran % 89.1 (*)     Lymph % 5.1 (*)     All other components within normal limits    Narrative:     Release to patient->Immediate   CLOSTRIDIUM DIFFICILE   CULTURE, STOOL   ENTEROHEMORRHAGIC E.COLI   HIV 1 / 2 ANTIBODY    Narrative:     Release to patient->Immediate   HEPATITIS C ANTIBODY    Narrative:     Release to patient->Immediate   SARS-COV-2 RNA AMPLIFICATION, QUAL   LIPASE    Narrative:     Release to patient->Immediate   URINALYSIS MICROSCOPIC    Narrative:     Specimen Source->Urine   POCT URINE PREGNANCY          Imaging Results    None          Medications   ondansetron injection 4 mg (4 mg Intravenous Given 3/28/23 0056)   sodium chloride 0.9% bolus 1,000 mL 1,000 mL (0 mLs Intravenous Stopped 3/28/23 0244)   dicyclomine capsule 20 mg (20 mg Oral Given 3/28/23 0254)   ketorolac injection 9.999 mg (9.999 mg Intravenous Given 3/28/23 0255)     Medical Decision Making:   History:   Old Medical Records: I decided to obtain old medical records.  Old Records Summarized: records from clinic visits and records from previous admission(s).  Differential  Diagnosis:   DDX included but not limited to viral gastroenteritis, bacterial gastroenteritis, gastritis, colitis, appendicitis, UTI, pyelonephritis, cholecystitis, COVID, flu, C diff  Clinical Tests:   Lab Tests: Reviewed and Ordered  ED Management:  Pt with a soft nontender abdomen so I dont think imaging is needed  She continued to have diarrhea while in the ED so stool culture and cdiff sent to the lab  Pt appeared dehydrated and was hydrated with IV fluids and tolerated p.o.  Her labs are unremarkable, I suspect either food poisoning or viral gastroenteritis at this point.    Patient was discharged with medicines for nausea and diarrhea.  The nurse did inform me that as patient was leaving they were notified that the stool sample was not labeled properly and was rejected from the lab.  Patient preferred to go home and was given a sterile cup for stool collection at home if she continued to have diarrhea    Discharged to home in stable condition, return to ED warnings given, follow up and patient care instructions given.               ED Course as of 04/01/23 1027   Tue Mar 28, 2023   0347 I re evaluated patient's heart rate and it was between 95 and 98. She is feeling better. She is tolerating po. [GK]   0348 Her abdomen remains soft, non-tender [GK]      ED Course User Index  [GK] Nancie Jenkins MD                 Clinical Impression:   Final diagnoses:  [R11.2] Nausea and vomiting, unspecified vomiting type (Primary)  [R19.7] Diarrhea, unspecified type        ED Disposition Condition    Discharge Stable          ED Prescriptions       Medication Sig Dispense Start Date End Date Auth. Provider    ondansetron (ZOFRAN-ODT) 4 MG TbDL Take 1 tablet (4 mg total) by mouth every 8 (eight) hours as needed. 10 tablet 3/28/2023 -- Nancie Jenkins MD    diphenoxylate-atropine 2.5-0.025 mg (LOMOTIL) 2.5-0.025 mg per tablet Take 2 tablets by mouth 3 (three) times daily as needed for Diarrhea. 12 tablet  3/28/2023 4/7/2023 Nancie Jenkins MD          Follow-up Information       Follow up With Specialties Details Why Contact Info    Will MD [facility]   [address]  [facility] [id number]  [phone]      [facility] Emergency Medicine  As needed, If symptoms worsen [address] [facility] [id number] [phone]             Nancie Jenkins MD  04/01/23 1027

## 2023-04-04 ENCOUNTER — PATIENT MESSAGE (OUTPATIENT)
Dept: RESEARCH | Facility: HOSPITAL | Age: 33
End: 2023-04-04
Payer: MEDICAID

## 2024-09-05 NOTE — PROGRESS NOTES
Chief Complaint: AUB     HPI:      Ermelinda is a 33 y.o. No obstetric history on file. who presents today for AUB.      Patient's last menstrual period was 08/19/2024.   She had light spotting for 1 week and then started a moderate flow until 9/2. No pelvic pain. Last week she was feeling more fatigued. She was also having pressure in between her eyes.  She did not had a period in June. Had period a July that lasted 7 days.   In her teens - she would skip a period for 1 month - very rare.   Normally - will get cramping 1 week prior to her period.   Normally - periods every 4 weeks, last 3-4 days with light to moderate flow.     Occasionally notices postcoital bleeding. Denies dyspareunia.     She has had lifestyle changes recently. Started NP program this summer. She has also started going to gym more. She has cut back on sugar.     She went to the ER in December 2023 and was told her thyroid was slightly abnormal.     Ermelinda is currently sexually active with a single male partner. She is currently using condoms for contraception. Not interested in discussing alternative birth control at this time.     Previous Pap: Negative per patient (Few years ago)  Denies history of abnormal pap tests.     Gardasil:Pt Unsure     History reviewed. No pertinent past medical history.    Current Outpatient Medications:     azelastine (ASTELIN) 137 mcg (0.1 %) nasal spray, 2 sprays (274 mcg total) by Nasal route 2 (two) times daily., Disp: 30 mL, Rfl: 0    fluticasone propionate (FLONASE) 50 mcg/actuation nasal spray, 2 sprays (100 mcg total) by Each Nostril route once daily. (Patient not taking: Reported on 9/6/2024), Disp: 15 g, Rfl: 0    hydrocortisone 2.5 % cream, Apply topically 2 (two) times daily. for 7 days, Disp: 28 g, Rfl: 0    ibuprofen (ADVIL,MOTRIN) 600 MG tablet, Take 1 tablet (600 mg total) by mouth 3 (three) times daily. (Patient not taking: Reported on 9/6/2024), Disp: 30 tablet, Rfl: 0    ID NOW COVID-19 TEST KIT  Kit, TEST AS DIRECTED TODAY (Patient not taking: Reported on 9/6/2024), Disp: , Rfl:     ondansetron (ZOFRAN-ODT) 4 MG TbDL, Take 1 tablet (4 mg total) by mouth every 8 (eight) hours as needed. (Patient not taking: Reported on 9/6/2024), Disp: 10 tablet, Rfl: 0   Review of patient's allergies indicates:  No Known Allergies  History reviewed. No pertinent surgical history.  Social History     Tobacco Use    Smoking status: Never    Smokeless tobacco: Never   Substance Use Topics    Alcohol use: Never    Drug use: Never     Family History   Problem Relation Name Age of Onset    Hypertension Mother      Hypertension Father         Physical Exam:      PHYSICAL EXAM:  BP (!) 140/87   Pulse 105   Wt 73.6 kg (162 lb 4.1 oz)   LMP 08/19/2024   BMI 33.91 kg/m²   Body mass index is 33.91 kg/m².     APPEARANCE: Well nourished, well developed, in no acute distress.  PELVIC:  External genitalia and urethra within normal limits. Vagina without lesions, without discharge, without erythema, without ulcers.  Cervix without cervical motion tenderness, non-friable. Wide ectropion noted, bled easily when obtaining pap and swabs. Bimanual exam deferred.     Chaperoned by: GEMMA Ramirez MA     Assessment/Plan:     Abnormal uterine bleeding  -     POCT urine pregnancy  -     TSH; Future; Expected date: 09/06/2024  -     T4, Free; Future; Expected date: 09/06/2024  -     HPV High Risk Genotypes, PCR  -     Liquid-Based Pap Smear, Screening  -     C. trachomatis/N. gonorrhoeae by AMP DNA [facility]; Cervicovaginal  -     Vaginosis Screen by DNA Probe    Postcoital bleeding  -     HPV High Risk Genotypes, PCR  -     Liquid-Based Pap Smear, Screening  -     C. trachomatis/N. gonorrhoeae by AMP DNA [facility]; Cervicovaginal  -     Vaginosis Screen by DNA Probe    Screening for cervical cancer  -     HPV High Risk Genotypes, PCR  -     Liquid-Based Pap Smear, Screening  -     C. trachomatis/N. gonorrhoeae by AMP DNA [facility]; Cervicovaginal  -      Vaginosis Screen by DNA Probe    Screening examination for STI  -     HPV High Risk Genotypes, PCR  -     Liquid-Based Pap Smear, Screening  -     C. trachomatis/N. gonorrhoeae by AMP DNA [facility]; Cervicovaginal  -     Vaginosis Screen by DNA Probe        PLAN:    Here today due to 1 episode of oligomenorrhea in 2024 and 1 episode of prolonged bleeding in 2024 as well as intermittent postcoital bleeding.   UPT negative. Pelvic exam overall unremarkable, wide ectropion noted.   Pap cotest, GC/CT and Affirm collected for further evaluation. Will check TFTs in addition due to history of subclinical hypothyroidism.   If AUB persists at 3 month follow-up, will consider further work-up with pelvic ultrasound as well as further lab work to evaluate for ovulatory dysfunction.     Follow up in about 3 months (around 2024) for WWE/AUB f/u.      Answers submitted by the patient for this visit:  Vaginal Bleeding Questionnaire  (Submitted on 2024)  Chief Complaint: Vaginal bleeding  Chronicity: new  Onset: more than 1 month ago  Frequency: daily  Progression since onset: unchanged  Pain severity: no pain  Affected side: both  Pregnant now?: No  abdominal pain: No  anorexia: No  back pain: No  chills: No  constipation: No  diarrhea: No  discolored urine: No  dysuria: No  fever: No  flank pain: No  frequency: No  headaches: Yes  hematuria: No  nausea: No  painful intercourse: No  rash: No  urgency: No  vomiting: No  Vaginal bleeding: lighter than menses  Passing clots?: No  Passing tissue?: No  Aggravated by: nothing  treatments tried: nothing  Improvement on treatment: no relief  Sexual activity: sexually active  Partner with STD symptoms: no  Menstrual history: irregular  STD: No  abdominal surgery: No   section: No  Ectopic pregnancy: No  Endometriosis: No  herpes simplex: No  gynecological surgery: No  menorrhagia: No  metrorrhagia: No  miscarriage: No  ovarian cysts: No  perineal abscess: No  PID:  No  terminated pregnancy: No  vaginosis: No

## 2024-09-06 ENCOUNTER — PATIENT MESSAGE (OUTPATIENT)
Dept: OBSTETRICS AND GYNECOLOGY | Facility: CLINIC | Age: 34
End: 2024-09-06

## 2024-09-06 ENCOUNTER — OFFICE VISIT (OUTPATIENT)
Dept: OBSTETRICS AND GYNECOLOGY | Facility: CLINIC | Age: 34
End: 2024-09-06
Payer: COMMERCIAL

## 2024-09-06 ENCOUNTER — LAB VISIT (OUTPATIENT)
Dept: LAB | Facility: HOSPITAL | Age: 34
End: 2024-09-06
Payer: COMMERCIAL

## 2024-09-06 VITALS
HEART RATE: 105 BPM | SYSTOLIC BLOOD PRESSURE: 140 MMHG | BODY MASS INDEX: 33.91 KG/M2 | DIASTOLIC BLOOD PRESSURE: 87 MMHG | WEIGHT: 162.25 LBS

## 2024-09-06 DIAGNOSIS — N93.9 ABNORMAL UTERINE BLEEDING: ICD-10-CM

## 2024-09-06 DIAGNOSIS — Z12.4 SCREENING FOR CERVICAL CANCER: ICD-10-CM

## 2024-09-06 DIAGNOSIS — N93.9 ABNORMAL UTERINE BLEEDING: Primary | ICD-10-CM

## 2024-09-06 DIAGNOSIS — Z11.3 SCREENING EXAMINATION FOR STI: ICD-10-CM

## 2024-09-06 DIAGNOSIS — N93.0 POSTCOITAL BLEEDING: ICD-10-CM

## 2024-09-06 DIAGNOSIS — E03.8 SUBCLINICAL HYPOTHYROIDISM: Primary | ICD-10-CM

## 2024-09-06 LAB
B-HCG UR QL: NEGATIVE
CTP QC/QA: YES
T4 FREE SERPL-MCNC: 0.9 NG/DL (ref 0.71–1.51)
TSH SERPL DL<=0.005 MIU/L-ACNC: 5.77 UIU/ML (ref 0.4–4)

## 2024-09-06 PROCEDURE — 84443 ASSAY THYROID STIM HORMONE: CPT | Performed by: NURSE PRACTITIONER

## 2024-09-06 PROCEDURE — 84439 ASSAY OF FREE THYROXINE: CPT | Performed by: NURSE PRACTITIONER

## 2024-09-06 PROCEDURE — 81514 NFCT DS BV&VAGINITIS DNA ALG: CPT | Performed by: NURSE PRACTITIONER

## 2024-09-06 PROCEDURE — 36415 COLL VENOUS BLD VENIPUNCTURE: CPT | Performed by: NURSE PRACTITIONER

## 2024-09-06 PROCEDURE — 99999 PR PBB SHADOW E&M-EST. PATIENT-LVL III: CPT | Mod: PBBFAC,,, | Performed by: NURSE PRACTITIONER

## 2024-09-06 PROCEDURE — 87491 CHLMYD TRACH DNA AMP PROBE: CPT | Performed by: NURSE PRACTITIONER

## 2024-09-07 LAB
BACTERIAL VAGINOSIS DNA: NEGATIVE
C TRACH DNA SPEC QL NAA+PROBE: NOT DETECTED
CANDIDA GLABRATA DNA: NEGATIVE
CANDIDA KRUSEI DNA: NEGATIVE
CANDIDA RRNA VAG QL PROBE: NEGATIVE
N GONORRHOEA DNA SPEC QL NAA+PROBE: NOT DETECTED
T VAGINALIS RRNA GENITAL QL PROBE: NEGATIVE

## 2024-09-17 ENCOUNTER — TELEPHONE (OUTPATIENT)
Dept: INTERNAL MEDICINE | Facility: CLINIC | Age: 34
End: 2024-09-17
Payer: COMMERCIAL

## 2024-09-17 ENCOUNTER — OFFICE VISIT (OUTPATIENT)
Dept: INTERNAL MEDICINE | Facility: CLINIC | Age: 34
End: 2024-09-17
Payer: COMMERCIAL

## 2024-09-17 DIAGNOSIS — E03.8 SUBCLINICAL HYPOTHYROIDISM: Primary | ICD-10-CM

## 2024-09-17 PROCEDURE — 99203 OFFICE O/P NEW LOW 30 MIN: CPT | Mod: 95,,, | Performed by: FAMILY MEDICINE

## 2024-09-17 NOTE — PROGRESS NOTES
Subjective:     Patient ID: Ermelinda Trivedi is a 33 y.o. female.   Chief Complaint: No chief complaint on file.    HPI:  The patient location is: [place]  The chief complaint leading to consultation is: abnormal lab results    Visit type: audiovisual    Face to Face time with patient: 13 minutes  30 minutes of total time spent on the encounter, which includes face to face time and non-face to face time preparing to see the patient (eg, review of tests), Obtaining and/or reviewing separately obtained history, Documenting clinical information in the electronic or other health record, Independently interpreting results (not separately reported) and communicating results to the patient/family/caregiver, or Care coordination (not separately reported).         Each patient to whom he or she provides medical services by telemedicine is:  (1) informed of the relationship between the physician and patient and the respective role of any other health care provider with respect to management of the patient; and (2) notified that he or she may decline to receive medical services by telemedicine and may withdraw from such care at any time.    Notes:   Reports she saw her OB/GYN about 2 weeks ago for AUB. Has had menstrual irregularity over the last 2 months or so. Most recent cycle lasted longer than expected. She had some labs drawn at that visit, including TFTs. Results of these were notable for high TSH and normal Free T4. Patient reports prior thyroid w/u showing subclinical hypothyroid pattern on 2 prior lab draws from 2021 and 2023. TSH has always been just outside of the reference range. Most recent TSH was a little further outside normal range than previous.    Denies hair/skin changes, appetite change, weight gain, constipation, cold intolerance. Does endorse fatigue, exacerbated during menstruation. Does not currently have a PCP.    Review of Systems   Constitutional:  Positive for fatigue. Negative for activity change,  appetite change and unexpected weight change.   HENT:  Negative for hearing loss, rhinorrhea and trouble swallowing.    Eyes:  Negative for discharge and visual disturbance.   Respiratory:  Negative for chest tightness and wheezing.    Cardiovascular:  Negative for chest pain and palpitations.   Gastrointestinal:  Negative for blood in stool, constipation, diarrhea and vomiting.   Endocrine: Negative for cold intolerance, heat intolerance, polydipsia, polyphagia and polyuria.   Genitourinary:  Positive for menstrual problem. Negative for difficulty urinating, dysuria and hematuria.   Musculoskeletal:  Negative for arthralgias, joint swelling and neck pain.   Neurological:  Negative for weakness and headaches.   Psychiatric/Behavioral:  Negative for confusion and dysphoric mood.           Objective:      There were no vitals filed for this visit.   Physical Exam  Constitutional:       General: She is not in acute distress.     Appearance: Normal appearance. She is not ill-appearing.   Pulmonary:      Effort: Pulmonary effort is normal. No respiratory distress.   Neurological:      General: No focal deficit present.      Mental Status: She is alert and oriented to person, place, and time. Mental status is at baseline.      Cranial Nerves: No dysarthria.   Psychiatric:         Mood and Affect: Mood normal.         Behavior: Behavior normal.         Thought Content: Thought content normal.         Judgment: Judgment normal.     As this visit was accomplished virtually, physical exam is limited only to what may be observed via the telehealth audiovisual connection.      Assessment:       Problem List Items Addressed This Visit    None  Visit Diagnoses       Subclinical hypothyroidism    -  Primary              Plan:       1. Subclinical hypothyroidism  Reviewed results w/ pt today  Discussed subclinical hypothyroidism dx and its implications  Pt's TSH is increased from prior draws; may be from different lab references  but could also indicate progressing disease  Pt could possibly benefit from low-dose thyroid supplementation - discussed possibility w/ pt today  Would also need to consider alt causes for fatigue and AUB  Recommend pt set up in-person visit to establish care, plan to obtain full panel of labs including anemia studies to explore other potential causes of her sx  Will decide on tx for thyroid dysfunction pending those results  - Ambulatory referral/consult to Internal Medicine          No follow-ups on file.     Chadd Lynn MD, FAAFP  Family Medicine Physician  [facility]  09/17/2024      This note was produced using voice recognition technology. Some typographical or syntax errors may be present, despite best efforts with proofreading.

## 2024-09-23 ENCOUNTER — OFFICE VISIT (OUTPATIENT)
Dept: OBSTETRICS AND GYNECOLOGY | Facility: CLINIC | Age: 34
End: 2024-09-23
Payer: COMMERCIAL

## 2024-09-23 DIAGNOSIS — N93.0 POSTCOITAL BLEEDING: ICD-10-CM

## 2024-09-23 DIAGNOSIS — N93.9 ABNORMAL UTERINE BLEEDING: Primary | ICD-10-CM

## 2024-09-23 PROCEDURE — 99213 OFFICE O/P EST LOW 20 MIN: CPT | Mod: 95,,, | Performed by: NURSE PRACTITIONER

## 2024-09-23 NOTE — Clinical Note
Will you please call patient to schedule lab appointment, pelvic ultrasound, and follow up appointment with me after pelvic ultrasound? F/u appt can be virtual.   Thanks, Conchita

## 2024-09-23 NOTE — PROGRESS NOTES
Chief Complaint: AUB Follow-up     The patient location is: Home  The chief complaint leading to consultation is: AUB Follow-up    Visit type: audiovisual    Face to Face time with patient: 10  15 minutes of total time spent on the encounter, which includes face to face time and non-face to face time preparing to see the patient (eg, review of tests), Obtaining and/or reviewing separately obtained history, Documenting clinical information in the electronic or other health record, Independently interpreting results (not separately reported) and communicating results to the patient/family/caregiver, or Care coordination (not separately reported).     Each patient to whom he or she provides medical services by telemedicine is:  (1) informed of the relationship between the physician and patient and the respective role of any other health care provider with respect to management of the patient; and (2) notified that he or she may decline to receive medical services by telemedicine and may withdraw from such care at any time.    HPI:     Changes since last visit   She had spotting that started on 9/7, lasted for 2 days   She started spotting again today     Saw PCP for subclinical hypothyroidism, considering treatment     Recent Affirm, GC/CT negative   Recent Pap: NILM, HPV negative   Recent TSH 5.766, FT4 wnl     Previous HPI (9/6/2024)  Ermelinda is a 33 y.o. No obstetric history on file. who presents today for AUB.      Patient's last menstrual period was 08/19/2024.   She had light spotting for 1 week and then started a moderate flow until 9/2. No pelvic pain. Last week she was feeling more fatigued. She was also having pressure in between her eyes.  She did not had a period in June. Had period a July that lasted 7 days.   In her teens - she would skip a period for 1 month - very rare.   Normally - will get cramping 1 week prior to her period.   Normally - periods every 4 weeks, last 3-4 days with light to moderate  flow.     Occasionally notices postcoital bleeding. Denies dyspareunia.     She has had lifestyle changes recently. Started NP program this summer. She has also started going to gym more. She has cut back on sugar.     She went to the ER in December 2023 and was told her thyroid was slightly abnormal.     Ermelinda is currently sexually active with a single male partner. She is currently using condoms for contraception. Not interested in discussing alternative birth control at this time.     Previous Pap: Negative per patient (Few years ago)  Denies history of abnormal pap tests.     Gardasil:Pt Unsure     No past medical history on file.    Current Outpatient Medications:     azelastine (ASTELIN) 137 mcg (0.1 %) nasal spray, 2 sprays (274 mcg total) by Nasal route 2 (two) times daily., Disp: 30 mL, Rfl: 0    fluticasone propionate (FLONASE) 50 mcg/actuation nasal spray, 2 sprays (100 mcg total) by Each Nostril route once daily. (Patient not taking: Reported on 9/6/2024), Disp: 15 g, Rfl: 0    hydrocortisone 2.5 % cream, Apply topically 2 (two) times daily. for 7 days, Disp: 28 g, Rfl: 0    ibuprofen (ADVIL,MOTRIN) 600 MG tablet, Take 1 tablet (600 mg total) by mouth 3 (three) times daily. (Patient not taking: Reported on 9/6/2024), Disp: 30 tablet, Rfl: 0    ID NOW COVID-19 TEST KIT Kit, TEST AS DIRECTED TODAY (Patient not taking: Reported on 9/6/2024), Disp: , Rfl:     ondansetron (ZOFRAN-ODT) 4 MG TbDL, Take 1 tablet (4 mg total) by mouth every 8 (eight) hours as needed. (Patient not taking: Reported on 9/6/2024), Disp: 10 tablet, Rfl: 0   Review of patient's allergies indicates:  No Known Allergies  No past surgical history on file.  Social History     Tobacco Use    Smoking status: Never    Smokeless tobacco: Never   Substance Use Topics    Alcohol use: Never    Drug use: Never     Family History   Problem Relation Name Age of Onset    Hypertension Mother      Hypertension Father         Physical Exam:       PHYSICAL EXAM:  LMP 08/19/2024   There is no height or weight on file to calculate BMI.     APPEARANCE: Well nourished, well developed, in no acute distress.      Assessment/Plan:     Abnormal uterine bleeding  -     17-Hydroxyprogesterone; Future; Expected date: 09/23/2024  -     DHEA-Sulfate; Future; Expected date: 09/23/2024  -     Estradiol; Future; Expected date: 09/23/2024  -     Follicle Stimulating Hormone; Future; Expected date: 09/23/2024  -     HCG, Quantitative; Future; Expected date: 09/23/2024  -     Luteinizing Hormone; Future; Expected date: 09/23/2024  -     Prolactin; Future; Expected date: 09/23/2024  -     Testosterone; Future; Expected date: 09/23/2024  -     US Pelvis Comp with Transvag NON-OB (xpd; Future; Expected date: 09/23/2024  -     CBC Auto Differential; Future; Expected date: 09/23/2024    Postcoital bleeding  -     17-Hydroxyprogesterone; Future; Expected date: 09/23/2024  -     DHEA-Sulfate; Future; Expected date: 09/23/2024  -     Estradiol; Future; Expected date: 09/23/2024  -     Follicle Stimulating Hormone; Future; Expected date: 09/23/2024  -     HCG, Quantitative; Future; Expected date: 09/23/2024  -     Luteinizing Hormone; Future; Expected date: 09/23/2024  -     Prolactin; Future; Expected date: 09/23/2024  -     Testosterone; Future; Expected date: 09/23/2024  -     US Pelvis Comp with Transvag NON-OB (xpd; Future; Expected date: 09/23/2024  -     CBC Auto Differential; Future; Expected date: 09/23/2024          PLAN:    Crixtin was counseled today on the possible causes of AUB-oligomenorrhea/menometrorrhagia, postcoital bleeding   Will check lab work to rule out etiologies of ovulatory dysfunction as well as pelvic ultrasound to rule out structural etiology of Aub   Briefly discussed medication management with hormonal contraception and will discuss further at follow up appointment     Follow up for pelvic ultrasound and appointment .      Answers submitted by the  patient for this visit:  Vaginal Bleeding Questionnaire  (Submitted on 2024)  Chief Complaint: Vaginal bleeding  Chronicity: new  Onset: more than 1 month ago  Frequency: daily  Progression since onset: unchanged  Pain severity: no pain  Affected side: both  Pregnant now?: No  abdominal pain: No  anorexia: No  back pain: No  chills: No  constipation: No  diarrhea: No  discolored urine: No  dysuria: No  fever: No  flank pain: No  frequency: No  headaches: Yes  hematuria: No  nausea: No  painful intercourse: No  rash: No  urgency: No  vomiting: No  Vaginal bleeding: lighter than menses  Passing clots?: No  Passing tissue?: No  Aggravated by: nothing  treatments tried: nothing  Improvement on treatment: no relief  Sexual activity: sexually active  Partner with STD symptoms: no  Menstrual history: irregular  STD: No  abdominal surgery: No   section: No  Ectopic pregnancy: No  Endometriosis: No  herpes simplex: No  gynecological surgery: No  menorrhagia: No  metrorrhagia: No  miscarriage: No  ovarian cysts: No  perineal abscess: No  PID: No  terminated pregnancy: No  vaginosis: No

## 2024-09-24 ENCOUNTER — TELEPHONE (OUTPATIENT)
Dept: OBSTETRICS AND GYNECOLOGY | Facility: CLINIC | Age: 34
End: 2024-09-24
Payer: COMMERCIAL

## 2024-09-24 NOTE — TELEPHONE ENCOUNTER
LVM for pt. To call office in regards to s/c appts per Conchita Kelly NP.    ----- Message from Conchita Kelly NP sent at 9/23/2024  3:40 PM CDT -----  Will you please call patient to schedule lab appointment, pelvic ultrasound, and follow up appointment with me after pelvic ultrasound? F/u appt can be virtual.     Thanks,  Conchita

## 2024-09-25 ENCOUNTER — PATIENT MESSAGE (OUTPATIENT)
Dept: OBSTETRICS AND GYNECOLOGY | Facility: CLINIC | Age: 34
End: 2024-09-25
Payer: COMMERCIAL

## 2024-09-26 ENCOUNTER — TELEPHONE (OUTPATIENT)
Dept: OBSTETRICS AND GYNECOLOGY | Facility: CLINIC | Age: 34
End: 2024-09-26
Payer: COMMERCIAL

## 2024-09-26 ENCOUNTER — NURSE TRIAGE (OUTPATIENT)
Dept: ADMINISTRATIVE | Facility: CLINIC | Age: 34
End: 2024-09-26
Payer: COMMERCIAL

## 2024-09-26 NOTE — TELEPHONE ENCOUNTER
Has been seeing Dr. Kelly [facility]ONNE for abnormal bleeding.     Menstrual period since 9/23, heavier each day. Yesterday went through 6-7 maxi pads. Has changed pad twice today since 0600, confirms changing for hygiene purposes, not due to saturation.     More fatigue than usual, slightly weak. No difficulty ambulating. Mild cramping on & off.     Dispo-be seen within 2 weeks. Discussed keeping general record of flow until then, callback symptoms & message would be routed to OBGYN requesting callback for apt options.       Reason for Disposition   Periods with > 6 soaked pads or tampons per day    Additional Information   Negative: SEVERE vaginal bleeding (e.g., continuous red blood from vagina, or large blood clots) and very weak (can't stand)   Negative: Passed out (i.e., fainted, collapsed and was not responding)   Negative: Difficult to awaken or acting confused (e.g., disoriented, slurred speech)   Negative: Shock suspected (e.g., cold/pale/clammy skin, too weak to stand, low BP, rapid pulse)   Negative: Sounds like a life-threatening emergency to the triager   Negative: SEVERE abdominal pain (e.g., excruciating)   Negative: SEVERE dizziness (e.g., unable to stand, requires support to walk, feels like passing out now)   Negative: SEVERE vaginal bleeding (e.g., soaking 2 pads or tampons per hour and present 2 or more hours; 1 menstrual cup every 2 hours)   Negative: Patient sounds very sick or weak to the triager   Negative: MODERATE vaginal bleeding (i.e., soaking pad or tampon per hour and present > 6 hours; 1 menstrual cup every 6 hours)   Negative: Constant abdominal pain lasting > 2 hours   Negative: Pale skin (pallor) of new-onset or worsening   Negative: Taking Coumadin (warfarin) or other strong blood thinner, or known bleeding disorder (e.g., thrombocytopenia)   Negative: Skin bruises or nosebleed and not caused by an injury   Negative: Patient wants to be seen   Negative: Passed tissue (e.g., gray-white)    Negative: Bleeding or spotting after procedure (e.g., biopsy) or pelvic examination (e.g., pap smear) that lasts > 7 days    Protocols used: Vaginal Bleeding - [id number]

## 2024-09-26 NOTE — TELEPHONE ENCOUNTER
Lvm for pt in regards to this callback message. Did inform pt that Conchita VIDES was out of clinic until next week.     ----- Message from Ludwig Mcdonald sent at 9/26/2024 11:00 AM CDT -----  Regarding: Self [phone]  Type: Patient Call Back    Who called: Self     What is the request in detail: called in regards to stating that she is having some heavy bleeding and wanted to talk to the doctor about this. Stating she is thinking of going to the ER. Would like a call back.     Can the clinic reply by [facility]? No     Would the patient rather a call back or a response via [facility]? Call back     Best call back number: [phone]     Additional Information:    Thank you.

## 2024-10-03 ENCOUNTER — LAB VISIT (OUTPATIENT)
Dept: LAB | Facility: HOSPITAL | Age: 34
End: 2024-10-03
Attending: NURSE PRACTITIONER
Payer: COMMERCIAL

## 2024-10-03 DIAGNOSIS — N93.0 POSTCOITAL BLEEDING: ICD-10-CM

## 2024-10-03 DIAGNOSIS — N93.9 ABNORMAL UTERINE BLEEDING: ICD-10-CM

## 2024-10-03 LAB
BASOPHILS # BLD AUTO: 0.03 K/UL (ref 0–0.2)
BASOPHILS NFR BLD: 0.3 % (ref 0–1.9)
DHEA-S SERPL-MCNC: 288.4 UG/DL (ref 95.8–511.7)
DIFFERENTIAL METHOD BLD: ABNORMAL
EOSINOPHIL # BLD AUTO: 0.1 K/UL (ref 0–0.5)
EOSINOPHIL NFR BLD: 1.2 % (ref 0–8)
ERYTHROCYTE [DISTWIDTH] IN BLOOD BY AUTOMATED COUNT: 12.6 % (ref 11.5–14.5)
ESTRADIOL SERPL-MCNC: 28 PG/ML
FSH SERPL-ACNC: 6.88 MIU/ML
HCG INTACT+B SERPL-ACNC: <2.4 MIU/ML
HCT VFR BLD AUTO: 40.4 % (ref 37–48.5)
HGB BLD-MCNC: 12.6 G/DL (ref 12–16)
IMM GRANULOCYTES # BLD AUTO: 0.05 K/UL (ref 0–0.04)
IMM GRANULOCYTES NFR BLD AUTO: 0.6 % (ref 0–0.5)
LH SERPL-ACNC: 5.7 MIU/ML
LYMPHOCYTES # BLD AUTO: 2.3 K/UL (ref 1–4.8)
LYMPHOCYTES NFR BLD: 26.6 % (ref 18–48)
MCH RBC QN AUTO: 28 PG (ref 27–31)
MCHC RBC AUTO-ENTMCNC: 31.2 G/DL (ref 32–36)
MCV RBC AUTO: 90 FL (ref 82–98)
MONOCYTES # BLD AUTO: 0.5 K/UL (ref 0.3–1)
MONOCYTES NFR BLD: 6.2 % (ref 4–15)
NEUTROPHILS # BLD AUTO: 5.7 K/UL (ref 1.8–7.7)
NEUTROPHILS NFR BLD: 65.1 % (ref 38–73)
NRBC BLD-RTO: 0 /100 WBC
PLATELET # BLD AUTO: 327 K/UL (ref 150–450)
PMV BLD AUTO: 11.2 FL (ref 9.2–12.9)
PROLACTIN SERPL IA-MCNC: 6.8 NG/ML (ref 5.2–26.5)
RBC # BLD AUTO: 4.5 M/UL (ref 4–5.4)
TESTOST SERPL-MCNC: 30 NG/DL (ref 5–73)
WBC # BLD AUTO: 8.68 K/UL (ref 3.9–12.7)

## 2024-10-03 PROCEDURE — 36415 COLL VENOUS BLD VENIPUNCTURE: CPT | Performed by: NURSE PRACTITIONER

## 2024-10-03 PROCEDURE — 82627 DEHYDROEPIANDROSTERONE: CPT | Performed by: NURSE PRACTITIONER

## 2024-10-03 PROCEDURE — 82670 ASSAY OF TOTAL ESTRADIOL: CPT | Performed by: NURSE PRACTITIONER

## 2024-10-03 PROCEDURE — 83002 ASSAY OF GONADOTROPIN (LH): CPT | Performed by: NURSE PRACTITIONER

## 2024-10-03 PROCEDURE — 84146 ASSAY OF PROLACTIN: CPT | Performed by: NURSE PRACTITIONER

## 2024-10-03 PROCEDURE — 83498 ASY HYDROXYPROGESTERONE 17-D: CPT | Performed by: NURSE PRACTITIONER

## 2024-10-03 PROCEDURE — 83001 ASSAY OF GONADOTROPIN (FSH): CPT | Performed by: NURSE PRACTITIONER

## 2024-10-03 PROCEDURE — 84403 ASSAY OF TOTAL TESTOSTERONE: CPT | Performed by: NURSE PRACTITIONER

## 2024-10-03 PROCEDURE — 84702 CHORIONIC GONADOTROPIN TEST: CPT | Performed by: NURSE PRACTITIONER

## 2024-10-03 PROCEDURE — 85025 COMPLETE CBC W/AUTO DIFF WBC: CPT | Performed by: NURSE PRACTITIONER

## 2024-10-08 ENCOUNTER — PATIENT MESSAGE (OUTPATIENT)
Dept: OBSTETRICS AND GYNECOLOGY | Facility: CLINIC | Age: 34
End: 2024-10-08
Payer: COMMERCIAL

## 2024-10-08 LAB — 17OHP SERPL-MCNC: 69 NG/DL (ref 35–413)

## 2024-10-09 ENCOUNTER — TELEPHONE (OUTPATIENT)
Dept: OBSTETRICS AND GYNECOLOGY | Facility: CLINIC | Age: 34
End: 2024-10-09

## 2024-10-09 NOTE — TELEPHONE ENCOUNTER
Attempted to call pt 3x   Lvm for pt to give a callback in regards to being s/c a sooner f/u appt with Conchita Kelly NP after pt u/s that's s/c on 10/17/24

## 2024-10-16 ENCOUNTER — PATIENT MESSAGE (OUTPATIENT)
Dept: OBSTETRICS AND GYNECOLOGY | Facility: CLINIC | Age: 34
End: 2024-10-16
Payer: COMMERCIAL

## 2024-11-08 ENCOUNTER — TELEPHONE (OUTPATIENT)
Dept: OBSTETRICS AND GYNECOLOGY | Facility: CLINIC | Age: 34
End: 2024-11-08
Payer: COMMERCIAL

## 2024-11-12 ENCOUNTER — OFFICE VISIT (OUTPATIENT)
Dept: INTERNAL MEDICINE | Facility: CLINIC | Age: 34
End: 2024-11-12
Payer: COMMERCIAL

## 2024-11-12 ENCOUNTER — PATIENT MESSAGE (OUTPATIENT)
Dept: BEHAVIORAL HEALTH | Facility: CLINIC | Age: 34
End: 2024-11-12
Payer: COMMERCIAL

## 2024-11-12 VITALS
OXYGEN SATURATION: 98 % | BODY MASS INDEX: 34.11 KG/M2 | HEIGHT: 58 IN | SYSTOLIC BLOOD PRESSURE: 126 MMHG | DIASTOLIC BLOOD PRESSURE: 82 MMHG | HEART RATE: 108 BPM | WEIGHT: 162.5 LBS

## 2024-11-12 DIAGNOSIS — R53.83 FATIGUE, UNSPECIFIED TYPE: Primary | ICD-10-CM

## 2024-11-12 DIAGNOSIS — F41.9 ANXIETY: ICD-10-CM

## 2024-11-12 DIAGNOSIS — G89.29 CHRONIC LEFT SHOULDER PAIN: ICD-10-CM

## 2024-11-12 DIAGNOSIS — R01.1 HEART MURMUR: ICD-10-CM

## 2024-11-12 DIAGNOSIS — E03.8 SUBCLINICAL HYPOTHYROIDISM: ICD-10-CM

## 2024-11-12 DIAGNOSIS — R03.0 ELEVATED BLOOD PRESSURE READING WITHOUT DIAGNOSIS OF HYPERTENSION: ICD-10-CM

## 2024-11-12 DIAGNOSIS — M25.512 CHRONIC LEFT SHOULDER PAIN: ICD-10-CM

## 2024-11-12 PROBLEM — J06.9 VIRAL URI WITH COUGH: Status: RESOLVED | Noted: 2022-05-19 | Resolved: 2024-11-12

## 2024-11-12 PROCEDURE — 1160F RVW MEDS BY RX/DR IN RCRD: CPT | Mod: CPTII,S$GLB,, | Performed by: FAMILY MEDICINE

## 2024-11-12 PROCEDURE — 99215 OFFICE O/P EST HI 40 MIN: CPT | Mod: S$GLB,,, | Performed by: FAMILY MEDICINE

## 2024-11-12 PROCEDURE — 3079F DIAST BP 80-89 MM HG: CPT | Mod: CPTII,S$GLB,, | Performed by: FAMILY MEDICINE

## 2024-11-12 PROCEDURE — 99999 PR PBB SHADOW E&M-EST. PATIENT-LVL V: CPT | Mod: PBBFAC,,, | Performed by: FAMILY MEDICINE

## 2024-11-12 PROCEDURE — 3008F BODY MASS INDEX DOCD: CPT | Mod: CPTII,S$GLB,, | Performed by: FAMILY MEDICINE

## 2024-11-12 PROCEDURE — 3074F SYST BP LT 130 MM HG: CPT | Mod: CPTII,S$GLB,, | Performed by: FAMILY MEDICINE

## 2024-11-12 PROCEDURE — 1159F MED LIST DOCD IN RCRD: CPT | Mod: CPTII,S$GLB,, | Performed by: FAMILY MEDICINE

## 2024-11-12 NOTE — PROGRESS NOTES
Subjective:     Patient ID: Ermelinda Trivedi is a 34 y.o. female.   Chief Complaint: Follow-up    HPI:  History of Present Illness    CHIEF COMPLAINT:  Patient presents for follow-up on subclinical hypothyroidism, fatigue, and to discuss multiple health concerns including blood pressure, anxiety, and right arm pain.    HPI:  Patient reports ongoing fatigue, worsening during menstrual cycle. She recently completed her menstrual cycle last week, which was regular and appeared normal. Patient had seen OB/GYN due to irregular menstrual cycles over the summer.    Patient has a history of subclinical hypothyroidism. Her TSH levels have been monitored over the past 3 years. She has been taking vitamin D3 and B12 gummies to address fatigue.    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 She describes persistent mental activity and inability to alleviate stress.    Regarding blood pressure, the patient has been monitoring it at home. She provided recent readings, including 112/76, 110/76, 114/78, and 104/74. There was one outlier reading of 130/94 at the beginning of October. Patient expresses concern about blood pressure due to family history of hypertension.    Patient also complains of left shoulder pain for the last few months. The pain is severe upon waking after sleeping on that side and occurs when flexing the arm back. A recent fitness evaluation revealed weakness on her left side. Patient denies any specific injury but wonders if it could be related to improper weight lifting or sleeping position. She reports feeling and hearing a click in the shoulder when moving it.    Patient mentions a history of palpitations after eating certain foods, for which she saw a cardiologist at age 24. At that time, all tests were reported as normal. Patient denies any current palpitations or other cardiac symptoms.    Review of Systems  "  Constitutional:  Positive for fatigue. Negative for chills and fever.   HENT:  Negative for nasal congestion, ear pain and sore throat.    Eyes:  Negative for visual disturbance.   Respiratory:  Negative for cough and shortness of breath.    Cardiovascular:  Negative for chest pain and palpitations.   Gastrointestinal:  Negative for constipation, diarrhea, nausea and vomiting.   Genitourinary:  Negative for dysuria and frequency.   Musculoskeletal:  Positive for arthralgias. Negative for neck pain.   Integumentary:  Negative for rash.   Neurological:  Negative for weakness and headaches.   Psychiatric/Behavioral:  Negative for suicidal ideas. The patient is nervous/anxious.    All other systems reviewed and are negative.         Objective:      Vitals:    11/12/24 1500 11/12/24 1523   BP: (!) 138/90 126/82   BP Location: Left arm    Patient Position: Sitting    Pulse: 108    SpO2: 98%    Weight: 73.7 kg (162 lb 7.7 oz)    Height: 4' 10" (1.473 m)       Physical Exam  Vitals reviewed.   Constitutional:       General: She is not in acute distress.     Appearance: Normal appearance. She is not ill-appearing.   HENT:      Head: Normocephalic and atraumatic.      Right Ear: External ear normal.      Left Ear: External ear normal.      Nose: Nose normal.   Cardiovascular:      Rate and Rhythm: Normal rate and regular rhythm.      Pulses: Normal pulses.      Heart sounds: Murmur heard.      Systolic murmur is present with a grade of 2/6.      No friction rub. No gallop.   Pulmonary:      Effort: No respiratory distress.      Breath sounds: Normal breath sounds. No stridor. No wheezing, rhonchi or rales.   Abdominal:      General: Abdomen is flat. Bowel sounds are normal.      Palpations: Abdomen is soft.   Musculoskeletal:      Right shoulder: Normal.      Left shoulder: Tenderness present.      Cervical back: Normal range of motion. No tenderness.      Comments: Positive empty can test on L   Lymphadenopathy:      " Cervical: No cervical adenopathy.   Neurological:      General: No focal deficit present.      Mental Status: She is alert and oriented to person, place, and time. Mental status is at baseline.   Psychiatric:         Mood and Affect: Mood normal.         Behavior: Behavior normal.         Thought Content: Thought content normal.         Judgment: Judgment normal.           Assessment/Plan:             ICD-10-CM ICD-9-CM   1. Fatigue, unspecified type  R53.83 780.79   2. Subclinical hypothyroidism  E03.8 244.8   3. Elevated blood pressure reading without diagnosis of hypertension  R03.0 796.2   4. Anxiety  F41.9 300.00   5. Chronic left shoulder pain  M25.512 719.41    G89.29 338.29   6. Heart murmur  R01.1 785.2     Orders Placed This Encounter   Procedures    Comprehensive Metabolic Panel    CBC Auto Differential    Lipid Panel    Hemoglobin A1C    TSH    T4, Free    THYROGLOBULIN AB SCREEN    FERRITIN    Vitamin B12    FOLATE    Vitamin D    THYROID PEROXIDASE ANTIBODY    Ambulatory referral/consult to Primary Care Behavioral Health (Non-Opioids)    Ambulatory referral/consult to Physical/Occupational Therapy    Echo       Assessment & Plan    - Evaluated subclinical hypothyroidism; deferring levothyroxine pending repeat labs and additional thyroid panel  - Assessed fatigue; investigating less common causes given normal female hormone workup  - Identified subtle heart murmur (grade 2-3/6); ordering echocardiogram to rule out cardiac contribution to fatigue  - Diagnosed possible rotator cuff strain or bursitis in left shoulder based on physical exam  - Considered weight management options, prioritizing lifestyle modifications before pharmacological interventions    PLAN SUMMARY:  - Implement gentle, stability-focused exercises for affected shoulder  - Continue vitamin D3 and B12 gummies  - Start OTC anti-inflammatories (ibuprofen or naproxen) as needed for shoulder pain  - Maintain current exercise routine,  including cardio and weights  - Order vitamin D level, echocardiogram, thyroid panel, lipid panel (fasting), iron and B12 levels  - Refer to physical therapy for left shoulder evaluation and treatment  - Refer to primary care behavioral health for anxiety management  - Follow up after completing physical therapy and receiving test results    HEART MURMUR:  - Ordered echocardiogram.    ANXIETY:  - Discussed connection between stress, anxiety, and physical symptoms.  - Referred to primary care behavioral health for anxiety management.    HYPERTENSION:  - Patient to continue home blood pressure monitoring.  - No intervention indicated based on home readings.    LEFT SHOULDER PAIN:  - Recommend implementing gentle, stability-focused exercises for the affected shoulder.  - Started OTC anti-inflammatories (ibuprofen or naproxen) as needed for shoulder pain.  - Referred to physical therapy for left shoulder evaluation and treatment.    SUBCLINICAL HYPOTHYROIDISM:  - Ordered thyroid panel including TSH, thyroglobulin, and antibodies.             I spent a total of 55 minutes on the day of the visit.  This includes face to face time and non-face to face time preparing to see the patient (eg, review of tests), obtaining and/or reviewing separately obtained history, documenting clinical information in the electronic or other health record, independently interpreting results and communicating results to the patient/family/caregiver, or care coordinator.          No follow-ups on file.     Chadd Lynn MD, [facility]  Family Medicine Physician  [facility]  11/12/2024      This note was generated with the assistance of ambient listening technology. Verbal consent was obtained by the patient and accompanying visitor(s) for the recording of patient appointment to facilitate this note. I attest to having reviewed and edited the generated note for accuracy, though some syntax or spelling errors may  persist. Please contact the author of this note for any clarification.

## 2024-11-15 ENCOUNTER — PATIENT MESSAGE (OUTPATIENT)
Dept: BEHAVIORAL HEALTH | Facility: CLINIC | Age: 34
End: 2024-11-15
Payer: COMMERCIAL

## 2024-11-19 ENCOUNTER — LAB VISIT (OUTPATIENT)
Dept: LAB | Facility: HOSPITAL | Age: 34
End: 2024-11-19
Attending: FAMILY MEDICINE
Payer: COMMERCIAL

## 2024-11-19 ENCOUNTER — PATIENT MESSAGE (OUTPATIENT)
Dept: INTERNAL MEDICINE | Facility: CLINIC | Age: 34
End: 2024-11-19
Payer: COMMERCIAL

## 2024-11-19 DIAGNOSIS — E03.8 SUBCLINICAL HYPOTHYROIDISM: ICD-10-CM

## 2024-11-19 DIAGNOSIS — R53.83 FATIGUE, UNSPECIFIED TYPE: ICD-10-CM

## 2024-11-19 LAB
25(OH)D3+25(OH)D2 SERPL-MCNC: 20 NG/ML (ref 30–96)
ALBUMIN SERPL BCP-MCNC: 4.2 G/DL (ref 3.5–5.2)
ALP SERPL-CCNC: 75 U/L (ref 40–150)
ALT SERPL W/O P-5'-P-CCNC: 21 U/L (ref 10–44)
ANION GAP SERPL CALC-SCNC: 9 MMOL/L (ref 8–16)
AST SERPL-CCNC: 17 U/L (ref 10–40)
BASOPHILS # BLD AUTO: 0.04 K/UL (ref 0–0.2)
BASOPHILS NFR BLD: 0.3 % (ref 0–1.9)
BILIRUB SERPL-MCNC: 0.4 MG/DL (ref 0.1–1)
BUN SERPL-MCNC: 8 MG/DL (ref 6–20)
CALCIUM SERPL-MCNC: 9.7 MG/DL (ref 8.7–10.5)
CHLORIDE SERPL-SCNC: 103 MMOL/L (ref 95–110)
CHOLEST SERPL-MCNC: 120 MG/DL (ref 120–199)
CHOLEST/HDLC SERPL: 2.7 {RATIO} (ref 2–5)
CO2 SERPL-SCNC: 25 MMOL/L (ref 23–29)
CREAT SERPL-MCNC: 0.6 MG/DL (ref 0.5–1.4)
DIFFERENTIAL METHOD BLD: ABNORMAL
EOSINOPHIL # BLD AUTO: 0.1 K/UL (ref 0–0.5)
EOSINOPHIL NFR BLD: 0.9 % (ref 0–8)
ERYTHROCYTE [DISTWIDTH] IN BLOOD BY AUTOMATED COUNT: 12.7 % (ref 11.5–14.5)
EST. GFR  (NO RACE VARIABLE): >60 ML/MIN/1.73 M^2
ESTIMATED AVG GLUCOSE: 105 MG/DL (ref 68–131)
FERRITIN SERPL-MCNC: 45 NG/ML (ref 20–300)
FOLATE SERPL-MCNC: 9.4 NG/ML (ref 4–24)
GLUCOSE SERPL-MCNC: 90 MG/DL (ref 70–110)
HBA1C MFR BLD: 5.3 % (ref 4–5.6)
HCT VFR BLD AUTO: 36.4 % (ref 37–48.5)
HDLC SERPL-MCNC: 45 MG/DL (ref 40–75)
HDLC SERPL: 37.5 % (ref 20–50)
HGB BLD-MCNC: 12 G/DL (ref 12–16)
IMM GRANULOCYTES # BLD AUTO: 0.06 K/UL (ref 0–0.04)
IMM GRANULOCYTES NFR BLD AUTO: 0.5 % (ref 0–0.5)
LDLC SERPL CALC-MCNC: 55.8 MG/DL (ref 63–159)
LYMPHOCYTES # BLD AUTO: 2.3 K/UL (ref 1–4.8)
LYMPHOCYTES NFR BLD: 20 % (ref 18–48)
MCH RBC QN AUTO: 28.4 PG (ref 27–31)
MCHC RBC AUTO-ENTMCNC: 33 G/DL (ref 32–36)
MCV RBC AUTO: 86 FL (ref 82–98)
MONOCYTES # BLD AUTO: 0.6 K/UL (ref 0.3–1)
MONOCYTES NFR BLD: 5.2 % (ref 4–15)
NEUTROPHILS # BLD AUTO: 8.4 K/UL (ref 1.8–7.7)
NEUTROPHILS NFR BLD: 73.1 % (ref 38–73)
NONHDLC SERPL-MCNC: 75 MG/DL
NRBC BLD-RTO: 0 /100 WBC
PLATELET # BLD AUTO: 290 K/UL (ref 150–450)
PMV BLD AUTO: 11.3 FL (ref 9.2–12.9)
POTASSIUM SERPL-SCNC: 4.1 MMOL/L (ref 3.5–5.1)
PROT SERPL-MCNC: 7.5 G/DL (ref 6–8.4)
RBC # BLD AUTO: 4.22 M/UL (ref 4–5.4)
SODIUM SERPL-SCNC: 137 MMOL/L (ref 136–145)
T4 FREE SERPL-MCNC: 0.89 NG/DL (ref 0.71–1.51)
THYROGLOB AB SERPL IA-ACNC: <4 IU/ML (ref 0–3.9)
THYROPEROXIDASE IGG SERPL-ACNC: <6 IU/ML
TRIGL SERPL-MCNC: 96 MG/DL (ref 30–150)
TSH SERPL DL<=0.005 MIU/L-ACNC: 2.67 UIU/ML (ref 0.4–4)
VIT B12 SERPL-MCNC: 269 PG/ML (ref 210–950)
WBC # BLD AUTO: 11.49 K/UL (ref 3.9–12.7)

## 2024-11-19 PROCEDURE — 36415 COLL VENOUS BLD VENIPUNCTURE: CPT | Performed by: FAMILY MEDICINE

## 2024-11-19 PROCEDURE — 82728 ASSAY OF FERRITIN: CPT | Performed by: FAMILY MEDICINE

## 2024-11-19 PROCEDURE — 83036 HEMOGLOBIN GLYCOSYLATED A1C: CPT | Performed by: FAMILY MEDICINE

## 2024-11-19 PROCEDURE — 84443 ASSAY THYROID STIM HORMONE: CPT | Performed by: FAMILY MEDICINE

## 2024-11-19 PROCEDURE — 84439 ASSAY OF FREE THYROXINE: CPT | Performed by: FAMILY MEDICINE

## 2024-11-19 PROCEDURE — 82306 VITAMIN D 25 HYDROXY: CPT | Performed by: FAMILY MEDICINE

## 2024-11-19 PROCEDURE — 85025 COMPLETE CBC W/AUTO DIFF WBC: CPT | Performed by: FAMILY MEDICINE

## 2024-11-19 PROCEDURE — 82746 ASSAY OF FOLIC ACID SERUM: CPT | Performed by: FAMILY MEDICINE

## 2024-11-19 PROCEDURE — 86800 THYROGLOBULIN ANTIBODY: CPT | Performed by: FAMILY MEDICINE

## 2024-11-19 PROCEDURE — 86376 MICROSOMAL ANTIBODY EACH: CPT | Performed by: FAMILY MEDICINE

## 2024-11-19 PROCEDURE — 80053 COMPREHEN METABOLIC PANEL: CPT | Performed by: FAMILY MEDICINE

## 2024-11-19 PROCEDURE — 80061 LIPID PANEL: CPT | Performed by: FAMILY MEDICINE

## 2024-11-19 PROCEDURE — 82607 VITAMIN B-12: CPT | Performed by: FAMILY MEDICINE

## 2024-11-20 DIAGNOSIS — E55.9 VITAMIN D DEFICIENCY: Primary | ICD-10-CM

## 2024-11-20 RX ORDER — ERGOCALCIFEROL 1.25 MG/1
50000 CAPSULE ORAL
Qty: 12 CAPSULE | Refills: 0 | Status: SHIPPED | OUTPATIENT
Start: 2024-11-20 | End: 2025-02-06

## 2024-11-20 NOTE — TELEPHONE ENCOUNTER
Lov 11/12/24  You ordered labs on patient at her recent visit. She is inquiring about adding a BNP and Troponin to check for general CV related issues. I reviewed her chart note which states she denies any current palpitations or cardiac sxs

## 2024-12-03 ENCOUNTER — HOSPITAL ENCOUNTER (OUTPATIENT)
Dept: CARDIOLOGY | Facility: HOSPITAL | Age: 34
Discharge: HOME | End: 2024-12-03
Attending: FAMILY MEDICINE
Payer: COMMERCIAL

## 2024-12-03 VITALS
WEIGHT: 162 LBS | HEART RATE: 105 BPM | DIASTOLIC BLOOD PRESSURE: 65 MMHG | HEIGHT: 58 IN | BODY MASS INDEX: 34 KG/M2 | SYSTOLIC BLOOD PRESSURE: 112 MMHG

## 2024-12-03 DIAGNOSIS — R01.1 HEART MURMUR: ICD-10-CM

## 2024-12-03 LAB
ASCENDING AORTA: 2.5 CM
AV AREA BY CONTINUOUS VTI: 2.4 CM2
AV INDEX (PROSTH): 0.84
AV LVOT MEAN GRADIENT: 5 MMHG
AV LVOT PEAK GRADIENT: 9 MMHG
AV MEAN GRADIENT: 7.9 MMHG
AV PEAK GRADIENT: 16 MMHG
AV VALVE AREA BY VELOCITY RATIO: 2.1 CM²
AV VALVE AREA: 2.4 CM2
AV VELOCITY RATIO: 0.75
BSA FOR ECHO PROCEDURE: 1.73 M2
CV ECHO LV RWT: 0.21 CM
DOP CALC AO PEAK VEL: 2 M/S
DOP CALC AO VTI: 31.1 CM
DOP CALC LVOT AREA: 2.8 CM2
DOP CALC LVOT DIAMETER: 1.9 CM
DOP CALC LVOT PEAK VEL: 1.5 M/S
DOP CALC LVOT STROKE VOLUME: 74.2 CM3
DOP CALC RVOT AREA: 3.14 CM2
DOP CALC RVOT DIAMETER: 2 CM
DOP CALCLVOT PEAK VEL VTI: 26.2 CM
E WAVE DECELERATION TIME: 52.01 MS
E/A RATIO: 1.2
E/E' RATIO: 7.38 M/S
ECHO EF ESTIMATED: 80 %
ECHO LV POSTERIOR WALL: 0.5 CM (ref 0.6–1.1)
FRACTIONAL SHORTENING: 48.9 % (ref 28–44)
HR MV ECHO: 105 BPM
INTERVENTRICULAR SEPTUM: 0.5 CM (ref 0.6–1.1)
IVC DIAMETER: 1.18 CM
LA MAJOR: 4.34 CM
LA MINOR: 4.08 CM
LA WIDTH: 3.09 CM
LEFT ATRIUM SIZE: 3.13 CM
LEFT ATRIUM VOLUME INDEX MOD: 14.3 ML/M2
LEFT ATRIUM VOLUME INDEX: 20.7 ML/M2
LEFT ATRIUM VOLUME MOD: 23.81 ML
LEFT ATRIUM VOLUME: 34.58 CM3
LEFT INTERNAL DIMENSION IN SYSTOLE: 2.4 CM (ref 2.1–4)
LEFT VENTRICLE DIASTOLIC VOLUME INDEX: 61.92 ML/M2
LEFT VENTRICLE DIASTOLIC VOLUME: 103.4 ML
LEFT VENTRICLE MASS INDEX: 40.9 G/M2
LEFT VENTRICLE SYSTOLIC VOLUME INDEX: 12.6 ML/M2
LEFT VENTRICLE SYSTOLIC VOLUME: 20.97 ML
LEFT VENTRICULAR INTERNAL DIMENSION IN DIASTOLE: 4.7 CM (ref 3.5–6)
LEFT VENTRICULAR MASS: 68.3 G
LV LATERAL E/E' RATIO: 5.94
LV SEPTAL E/E' RATIO: 9.73
MV A" WAVE DURATION": 99.9 MS
MV PEAK A VEL: 0.89 M/S
MV PEAK E VEL: 1.07 M/S
OHS CV RV/LV RATIO: 0.53 CM
PULM VEIN A" WAVE DURATION": 99.9 MS
PULM VEIN S/D RATIO: 1.36
PULMONIC VEIN PEAK A VELOCITY: 0.4 M/S
PV PEAK D VEL: 0.45 M/S
PV PEAK S VEL: 0.61 M/S
RA MAJOR: 4.11 CM
RA PRESSURE ESTIMATED: 3 MMHG
RA WIDTH: 2.3 CM
RIGHT ATRIAL AREA: 10.4 CM2
RIGHT VENTRICLE DIASTOLIC BASEL DIMENSION: 2.5 CM
RV TISSUE DOPPLER FREE WALL SYSTOLIC VELOCITY 1 (APICAL 4 CHAMBER VIEW): 18.41 CM/S
SINUS: 2.3 CM
STJ: 2.25 CM
TDI LATERAL: 0.18 M/S
TDI SEPTAL: 0.11 M/S
TDI: 0.15 M/S
TRICUSPID ANNULAR PLANE SYSTOLIC EXCURSION: 2.08 CM
Z-SCORE OF LEFT VENTRICULAR DIMENSION IN END DIASTOLE: 0.07
Z-SCORE OF LEFT VENTRICULAR DIMENSION IN END SYSTOLE: -1.48

## 2024-12-03 PROCEDURE — 93306 TTE W/DOPPLER COMPLETE: CPT | Mod: 26,,, | Performed by: INTERNAL MEDICINE

## 2024-12-03 PROCEDURE — 93306 TTE W/DOPPLER COMPLETE: CPT

## 2024-12-07 NOTE — PROGRESS NOTES
[facility]   Physical Therapy Initial Evaluation      Name: Ermelinda Trivedi  [facility] Number: [id number]    Therapy Diagnosis:   Encounter Diagnoses   Name Primary?    Chronic left shoulder pain     Decreased range of motion of left shoulder Yes    Weakness of left shoulder     Scapular dysfunction     Decreased range of motion of thoracic spine         Physician: Chadd Lynn MD    Physician Orders: PT Eval and Treat   Medical Diagnosis from Referral:   M25.512,G89.29 (ICD-10-CM) - Chronic left shoulder pain   Evaluation Date: 12/9/2024  Authorization Period Expiration: 12/31/24  Plan of Care Expiration: 2/9/2024  Progress Note Due: 1/9/2024  Visit # / Visits authorized: 1/1   FOTO: 1/3    Precautions: Standard     Time In: 4:14 pm   Time Out: 5:04 pm   Total Billable Time: 50 minutes    Subjective     Date of onset: Approximately 6 Months - Worse in November     History of current condition - Ermelinda reports: That she had noticed some pain in her shoulder over the summer when she had started to work out at the gym but she didn't really notice any particular pain or irritation other than a couple of twinges present.  In November of this year she went on vacation and was sleeping in an unfamiliar bed when she started to really notice pain in the left shoulder.  She figured it was from the new bed, but she is a left side sleeper and it became too difficult to sleep on that side.  The pain gradually increased and became worse and worse.  She doesn't remember any specific injury or really any true start except for the fact that is got worse when she was on her trip.  When she is at rest she doesn't have any pain or discomfort but the pain begins when she starts to move her arm.  She has noticed a fair amount of clicking and popping in the shoulder with various movements especially while she is twisting her arm.  She describes the pain location as very difficult to place but feels like the pain  is coming from inside the joint but it can come around the front of the shoulder and into the arm a little bit.  She doesn't have any neck pain but when the pain was really bad she did have some pain up into the side of the neck and the upper trap.  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  She thinks that maybe increased stressed has made it worse but she isn't too sure.  She also is deficient in Vitamin D and thinks that it could be related.  She has been studying a lot and her posture hasn't been as ideal and before this time she did work in a sitting position a lot.  She denies any numbness or tingling but did have one instance where her fingers were tingling for a little bit but it has not occurred since.  She notices that she has pain when she is turning right in the car with one hand and she gets a lot of discomfort and pain and sometimes limitation when cleaning or reaching to pick something up.  She can't lift her arm up without pain and at a certain point it feels like it is too painful to move more especially with her arm bent in front of her (Stratton-Gabriele Position).  She has some relief using NSAIDs but not much and even less success with Icy Hot. She has had pain so bad it made her feel nauseous and faint.      Falls: No Falls     Imaging:   No Relevant Imaging     Prior Therapy: No   Social History:   Occupation: NP Student and PRN Case Management with [employer]   Prior Level of Function: Independent   Current Level of Function: Independent     Pain:  Current 1/10, worst 8/10, best 1/10   Location: Left Shoulder   Description: Aching and Strained   Aggravating Factors: Lying on Left Arm, Moving Left Arm   Easing Factors: Rest and NSAIDs     Patients goals: To be able to move her arm without pain, be able to go to the gym without pain, improve sleep quality     Medical History:   No past medical history on file.    Surgical History:   Crixtin  Shikha  has no past surgical history on file.    Medications:   Ermelinda has a current medication list which includes the following prescription(s): azelastine, ergocalciferol, and hydrocortisone.    Allergies:   Review of patient's allergies indicates:  No Known Allergies     Objective       Right ROM Right MMT Left ROM Left MMT Notes:   Shoulder Flexion:  (180) 180 4/5 125 3+/5    Shoulder Abduction:  (180) 160 4/5 110 3+/5    Shoulder ER:*^  (90) 100 4/5 100 4-/5    Shoulder IR:  (70) 65 4-/5 70 4-/5    Lower Trap: N/A 3/5 N/A 3-/5    Middle Trap: N/A 3-/5 N/A 3-/5    Upper Trap:  N/A 4-/5 N/A 3+/5    Serratus Anterior: N/A 4-/5 N/A 3+/5    Rhomboids: N/A 4+/5 N/A 4+/5      Posture Assessment:      - Bilateral Shoulder Depression (Left > Right)      - Downward Rotation Bilaterally      - Anterior Shoulder Placement and Rounding (Left > Right)      - Forward Head Posture Hinge at C6     - Significant Lumbar Lordosis     Observation:      - Significant Limitation of Thoracic Extension      - Limited Upward Rotation      - Excessive Shoulder Joint Mobility     Cervical Cluster:  Spurling's (--) (--)   Distraction (--) (--)   ULTT A NT NT   Ipsilateral Rotation <60 degrees (--) (--)   Cervical Rotation:  70 70   Cervical Side Bendin 40   Cervical Flexion:      Cervical Extension:        Special Tests:   Tests Right UE:  Left UE:    Stratton-Gabriele*  (--) (+)   Painful Arc*^ (--) (+)   Drop Arm^ (--) (--)   Belly Lift Off (--) (--)   Anterior Apprehension  (--) (+) Posterior Impingement    [place] Relocation (--) (+) Posterior Impingement   Neer's* (--) (+)   Empty Can* (--) (+)   External Rotation Lag Sign (--) (--)   Hornblower's Sign  (--) (--)   Champagne Test (--) (+)   Scapular Assist  (--) (+)   *Impingement Cluster   ^Full Thickness Tear Cluster    Sensation:  Dermatomes Right Left Comments   C1 Intact Intact    C2 Intact Intact    C3 Intact Intact    C4 Intact Intact    C5 Intact Intact    C6 Intact Intact     C7 Intact Intact    C8 Intact Intact    T1 Intact Intact      Reflexes:  Reflexes Positive Right  Positive Left Comments   Triceps 2+ 2+    Biceps 2+ 2+    Brachioradialis 2+ 2+      Nerve Tension Testing  ULTT Right  Left Comments   Median NT. NT.    Ulnar NT. NT.    Radial NT. NT.      Joint Mobility:      - Excessive Anterior Glide of Humeral Head      - Increased Posterior Glide of Humeral Head      - Decreased Mobility of Thoracic Spine with Spring Testing     Intake Outcome Measure for FOTO Survey    Therapist reviewed FOTO scores for Ermelinda Trivedi on 12/9/2024.   FOTO documents entered into [facility] - see Media section.    Intake Score: TBD%         Treatment     Total Treatment time (time-based codes) separate from Evaluation: 13 minutes     Ermelinda received the treatments listed below:      Therapeutic Exercises to develop strength, endurance, ROM, flexibility, and core stabilization for 0 minutes including:      Manual Therapy Techniques: The techniques below were applied for 0 minutes:      Neuromuscular Re-Education activities to improve: Balance, Coordination, Kinesthetic, Sense, Proprioception, and Posture for 0 minutes. The following activities were included:      Therapeutic Activities to improve functional performance for 13 minutes, including:    Patient Education (See Below)     hot pack for 0 minutes to .    cold pack for 0 minutes to .    Patient Education and Home Exercises     Education provided:   - HEP   - Plan of Care   - Explanation of Findings   - Shoulder Impingement and Associated Anatomy and Biomechanics     Written Home Exercises Provided: yes. Exercises were reviewed and Ermelinda was able to demonstrate them prior to the end of the session.  Ermelinda demonstrated good  understanding of the education provided. See EMR under Patient Instructions for exercises provided during therapy sessions.    Assessment     Ermelinda is a 34 y.o. female referred to outpatient Physical Therapy with a  medical diagnosis of   M25.512,G89.29 (ICD-10-CM) - Chronic left shoulder pain   . Patient presents with left shoulder pain beginning approximately 6 months ago but really progressing one month ago that is creating pain consistently throughout each day and limiting sleep quality, functional mobility, and general strengthening.  Assessment of scapulae revealed downward rotation, anterior tipping, and internal rotation with inadequate upward rotation, while assessment of the thoracic spine revealed significant mobility restrictions altering ideal biomechanics.  Subacromial impingement symptoms are present and confirmed with positive neer's, Stratton-Gabriele, Painful Arc, Painful Infraspinatus Testing, and altered scapular mechanics.  Patient did also have significant pain with testing to specific rotator cuff musculature but did not exhibited weakness consistent with large tears.  Patient does have periscapular weakness associated with decreased upward rotation and clearance of the subacromial space and with scapular assist test, patient had significant improvement and could reach near full flexion with almost no discomfort present.  Crixtin will benefit from skilled interventions to address scapular and rotator cuff deficits to improve functional movements and strengthening while decreasing irritation associated with subacromial impingement.  Next session will complete adverse neural tension to determine further neural involvement.      Patient prognosis is Good.   Patient will benefit from skilled outpatient Physical Therapy to address the deficits stated above and in the chart below, provide patient /family education, and to maximize patientt's level of independence.     Plan of care discussed with patient: Yes  Patient's spiritual, cultural and educational needs considered and patient is agreeable to the plan of care and goals as stated below:     Anticipated Barriers for therapy: Increased Mobility of the Shoulder      Medical Necessity is demonstrated by the following  History  Co-morbidities and personal factors that may impact the plan of care [x] LOW: no personal factors / co-morbidities  [] MODERATE: 1-2 personal factors / co-morbidities  [] HIGH: 3+ personal factors / co-morbidities    Moderate / High Support Documentation:   Co-morbidities affecting plan of care:          Examination  Body Structures and Functions, activity limitations and participation restrictions that may impact the plan of care [] LOW: addressing 1-2 elements  [x] MODERATE: 3+ elements  [] HIGH: 4+ elements (please support below)    Moderate / High Support Documentation: Shoulder Range of Motion, Cleaning Tasks, Reaching      Clinical Presentation [] LOW: stable  [x] MODERATE: Evolving  [] HIGH: Unstable     Decision Making/ Complexity Score: low       Goals:  Short Term Goals (4 Weeks):   1. Pt will be independent with HEP to supplement PT in improving functional use of R UE.  2. Pt will increase pain free shoulder elevation AROM to >/= 160 deg to improve functional mobility of UE  3. Pt will increase shoulder ER PROM in 0 deg abduction to >/=75 deg to improve functional mobility of UE  4. Pt will improve shoulder MMTs by 1/2 grade to promote equal use of B UEs in performing functional tasks.  Long Term Goals (8 Weeks):   1. Pt will improve FOTO score to </=TBD% limited to decrease perceived limitation with carrying, moving, and handling objects.  2. Pt will increase shoulder AROM to WFL in all planes to improve functional use of R RUE.  3. Pt will improve shoulder MMTs by 1 grade to promote equal use of B UEs in performing functional tasks.  5. Pt will lift 10 lb objects without pain to promote functional QOL.  6. Pt to report pain </= 0/10 with ADLs and IADLs using UE to improve functional QOL.   Plan     Plan of care Certification: 12/9/2024 to 2/9/2024.    Outpatient Physical Therapy 2 times weekly for 8 weeks to include the following  interventions: Cervical/Lumbar Traction, Electrical Stimulation  , Gait Training, Manual Therapy, Moist Heat/ Ice, Neuromuscular Re-ed, Orthotic Management and Training, Patient Education, Self Care, Therapeutic Activities, and Therapeutic Exercise.     Raoul Brewer, PT

## 2024-12-09 ENCOUNTER — CLINICAL SUPPORT (OUTPATIENT)
Dept: REHABILITATION | Facility: HOSPITAL | Age: 34
End: 2024-12-09
Payer: COMMERCIAL

## 2024-12-09 DIAGNOSIS — M25.512 CHRONIC LEFT SHOULDER PAIN: ICD-10-CM

## 2024-12-09 DIAGNOSIS — M25.612 DECREASED RANGE OF MOTION OF LEFT SHOULDER: Primary | ICD-10-CM

## 2024-12-09 DIAGNOSIS — M53.84 DECREASED RANGE OF MOTION OF THORACIC SPINE: ICD-10-CM

## 2024-12-09 DIAGNOSIS — M89.9 SCAPULAR DYSFUNCTION: ICD-10-CM

## 2024-12-09 DIAGNOSIS — G89.29 CHRONIC LEFT SHOULDER PAIN: ICD-10-CM

## 2024-12-09 DIAGNOSIS — R29.898 WEAKNESS OF LEFT SHOULDER: ICD-10-CM

## 2024-12-09 PROCEDURE — 97161 PT EVAL LOW COMPLEX 20 MIN: CPT

## 2024-12-09 PROCEDURE — 97530 THERAPEUTIC ACTIVITIES: CPT

## 2024-12-10 ENCOUNTER — TELEPHONE (OUTPATIENT)
Dept: INTERNAL MEDICINE | Facility: CLINIC | Age: 34
End: 2024-12-10
Payer: COMMERCIAL

## 2024-12-10 PROBLEM — R29.898 WEAKNESS OF LEFT SHOULDER: Status: ACTIVE | Noted: 2024-12-10

## 2024-12-10 PROBLEM — M53.84 DECREASED RANGE OF MOTION OF THORACIC SPINE: Status: ACTIVE | Noted: 2024-12-10

## 2024-12-10 PROBLEM — M89.9 SCAPULAR DYSFUNCTION: Status: ACTIVE | Noted: 2024-12-10

## 2024-12-10 PROBLEM — M25.612 DECREASED RANGE OF MOTION OF LEFT SHOULDER: Status: ACTIVE | Noted: 2024-12-10

## 2024-12-10 NOTE — TELEPHONE ENCOUNTER
----- Message from Viv sent at 12/9/2024  2:21 PM CST -----  Contact: [phone]  [email or web address]     Patient is calling for Medical Advice regarding:pt is asking for a callback for vitamin  D level and her test results form the echo.  She has questions about ergocalciferol (VITAMIN D2) 50,000 unit Cap 12 capsule    How long has patient had these symptoms:    Pharmacy name and phone#:    Patient wants a call back or thru [email or web address]:callback    Comments:    Please advise patient replies from provider may take up to 48 hours.

## 2024-12-11 ENCOUNTER — TELEPHONE (OUTPATIENT)
Dept: INTERNAL MEDICINE | Facility: CLINIC | Age: 34
End: 2024-12-11
Payer: COMMERCIAL

## 2024-12-11 NOTE — TELEPHONE ENCOUNTER
----- Message from Edel sent at 12/11/2024  3:19 PM CST -----  Contact: [phone]  Type: Returning a call    Who left a message? Missed the call     When did the practice call? 2:15 pm     Does patient know what this is regarding: Vitamin D    Would the patient rather a call back or a response via [facility]? Call back     Comments: Patient will be waiting for the call

## 2024-12-11 NOTE — PLAN OF CARE
[facility]   Physical Therapy Initial Evaluation      Name: Ermelinda Trivedi  [facility] Number: [id number]    Therapy Diagnosis:   Encounter Diagnoses   Name Primary?    Chronic left shoulder pain     Decreased range of motion of left shoulder Yes    Weakness of left shoulder     Scapular dysfunction     Decreased range of motion of thoracic spine         Physician: Chadd Lynn MD    Physician Orders: PT Eval and Treat   Medical Diagnosis from Referral:   M25.512,G89.29 (ICD-10-CM) - Chronic left shoulder pain   Evaluation Date: 12/9/2024  Authorization Period Expiration: 12/31/24  Plan of Care Expiration: 2/9/2024  Progress Note Due: 1/9/2024  Visit # / Visits authorized: 1/1   FOTO: 1/3    Precautions: Standard     Time In: 4:14 pm   Time Out: 5:04 pm   Total Billable Time: 50 minutes    Subjective     Date of onset: Approximately 6 Months - Worse in November     History of current condition - Ermelinda reports: That she had noticed some pain in her shoulder over the summer when she had started to work out at the gym but she didn't really notice any particular pain or irritation other than a couple of twinges present.  In November of this year she went on vacation and was sleeping in an unfamiliar bed when she started to really notice pain in the left shoulder.  She figured it was from the new bed, but she is a left side sleeper and it became too difficult to sleep on that side.  The pain gradually increased and became worse and worse.  She doesn't remember any specific injury or really any true start except for the fact that is got worse when she was on her trip.  When she is at rest she doesn't have any pain or discomfort but the pain begins when she starts to move her arm.  She has noticed a fair amount of clicking and popping in the shoulder with various movements especially while she is twisting her arm.  She describes the pain location as very difficult to place but feels like the pain  is coming from inside the joint but it can come around the front of the shoulder and into the arm a little bit.  She doesn't have any neck pain but when the pain was really bad she did have some pain up into the side of the neck and the upper trap.  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  She thinks that maybe increased stressed has made it worse but she isn't too sure.  She also is deficient in Vitamin D and thinks that it could be related.  She has been studying a lot and her posture hasn't been as ideal and before this time she did work in a sitting position a lot.  She denies any numbness or tingling but did have one instance where her fingers were tingling for a little bit but it has not occurred since.  She notices that she has pain when she is turning right in the car with one hand and she gets a lot of discomfort and pain and sometimes limitation when cleaning or reaching to pick something up.  She can't lift her arm up without pain and at a certain point it feels like it is too painful to move more especially with her arm bent in front of her (Stratton-Gabriele Position).  She has some relief using NSAIDs but not much and even less success with Icy Hot. She has had pain so bad it made her feel nauseous and faint.      Falls: No Falls     Imaging:   No Relevant Imaging     Prior Therapy: No   Social History:   Occupation: NP Student and PRN Case Management with [employer]   Prior Level of Function: Independent   Current Level of Function: Independent     Pain:  Current 1/10, worst 8/10, best 1/10   Location: Left Shoulder   Description: Aching and Strained   Aggravating Factors: Lying on Left Arm, Moving Left Arm   Easing Factors: Rest and NSAIDs     Patients goals: To be able to move her arm without pain, be able to go to the gym without pain, improve sleep quality     Medical History:   No past medical history on file.    Surgical History:   Crixtin  Shikha  has no past surgical history on file.    Medications:   Ermelinda has a current medication list which includes the following prescription(s): azelastine, ergocalciferol, and hydrocortisone.    Allergies:   Review of patient's allergies indicates:  No Known Allergies     Objective       Right ROM Right MMT Left ROM Left MMT Notes:   Shoulder Flexion:  (180) 180 4/5 125 3+/5    Shoulder Abduction:  (180) 160 4/5 110 3+/5    Shoulder ER:*^  (90) 100 4/5 100 4-/5    Shoulder IR:  (70) 65 4-/5 70 4-/5    Lower Trap: N/A 3/5 N/A 3-/5    Middle Trap: N/A 3-/5 N/A 3-/5    Upper Trap:  N/A 4-/5 N/A 3+/5    Serratus Anterior: N/A 4-/5 N/A 3+/5    Rhomboids: N/A 4+/5 N/A 4+/5      Posture Assessment:      - Bilateral Shoulder Depression (Left > Right)      - Downward Rotation Bilaterally      - Anterior Shoulder Placement and Rounding (Left > Right)      - Forward Head Posture Hinge at C6     - Significant Lumbar Lordosis     Observation:      - Significant Limitation of Thoracic Extension      - Limited Upward Rotation      - Excessive Shoulder Joint Mobility     Cervical Cluster:  Spurling's (--) (--)   Distraction (--) (--)   ULTT A NT NT   Ipsilateral Rotation <60 degrees (--) (--)   Cervical Rotation:  70 70   Cervical Side Bendin 40   Cervical Flexion:      Cervical Extension:        Special Tests:   Tests Right UE:  Left UE:    Stratton-Gabriele*  (--) (+)   Painful Arc*^ (--) (+)   Drop Arm^ (--) (--)   Belly Lift Off (--) (--)   Anterior Apprehension  (--) (+) Posterior Impingement    [place] Relocation (--) (+) Posterior Impingement   Neer's* (--) (+)   Empty Can* (--) (+)   External Rotation Lag Sign (--) (--)   Hornblower's Sign  (--) (--)   Champagne Test (--) (+)   Scapular Assist  (--) (+)   *Impingement Cluster   ^Full Thickness Tear Cluster    Sensation:  Dermatomes Right Left Comments   C1 Intact Intact    C2 Intact Intact    C3 Intact Intact    C4 Intact Intact    C5 Intact Intact    C6 Intact Intact     C7 Intact Intact    C8 Intact Intact    T1 Intact Intact      Reflexes:  Reflexes Positive Right  Positive Left Comments   Triceps 2+ 2+    Biceps 2+ 2+    Brachioradialis 2+ 2+      Nerve Tension Testing  ULTT Right  Left Comments   Median NT. NT.    Ulnar NT. NT.    Radial NT. NT.      Joint Mobility:      - Excessive Anterior Glide of Humeral Head      - Increased Posterior Glide of Humeral Head      - Decreased Mobility of Thoracic Spine with Spring Testing     Intake Outcome Measure for FOTO Survey    Therapist reviewed FOTO scores for Ermelinda Trivedi on 12/9/2024.   FOTO documents entered into [employer] - see Media section.    Intake Score: TBD%         Treatment     Total Treatment time (time-based codes) separate from Evaluation: 13 minutes     Ermelinda received the treatments listed below:      Therapeutic Exercises to develop strength, endurance, ROM, flexibility, and core stabilization for 0 minutes including:      Manual Therapy Techniques: The techniques below were applied for 0 minutes:      Neuromuscular Re-Education activities to improve: Balance, Coordination, Kinesthetic, Sense, Proprioception, and Posture for 0 minutes. The following activities were included:      Therapeutic Activities to improve functional performance for 13 minutes, including:    Patient Education (See Below)     hot pack for 0 minutes to .    cold pack for 0 minutes to .    Patient Education and Home Exercises     Education provided:   - HEP   - Plan of Care   - Explanation of Findings   - Shoulder Impingement and Associated Anatomy and Biomechanics     Written Home Exercises Provided: yes. Exercises were reviewed and Ermelinda was able to demonstrate them prior to the end of the session.  Ermelinda demonstrated good  understanding of the education provided. See EMR under Patient Instructions for exercises provided during therapy sessions.    Assessment     Ermelinda is a 34 y.o. female referred to outpatient Physical Therapy with a  medical diagnosis of   M25.512,G89.29 (ICD-10-CM) - Chronic left shoulder pain   . Patient presents with left shoulder pain beginning approximately 6 months ago but really progressing one month ago that is creating pain consistently throughout each day and limiting sleep quality, functional mobility, and general strengthening.  Assessment of scapulae revealed downward rotation, anterior tipping, and internal rotation with inadequate upward rotation, while assessment of the thoracic spine revealed significant mobility restrictions altering ideal biomechanics.  Subacromial impingement symptoms are present and confirmed with positive neer's, Stratton-Gabriele, Painful Arc, Painful Infraspinatus Testing, and altered scapular mechanics.  Patient did also have significant pain with testing to specific rotator cuff musculature but did not exhibited weakness consistent with large tears.  Patient does have periscapular weakness associated with decreased upward rotation and clearance of the subacromial space and with scapular assist test, patient had significant improvement and could reach near full flexion with almost no discomfort present.  Crixtin will benefit from skilled interventions to address scapular and rotator cuff deficits to improve functional movements and strengthening while decreasing irritation associated with subacromial impingement.  Next session will complete adverse neural tension to determine further neural involvement.      Patient prognosis is Good.   Patient will benefit from skilled outpatient Physical Therapy to address the deficits stated above and in the chart below, provide patient /family education, and to maximize patientt's level of independence.     Plan of care discussed with patient: Yes  Patient's spiritual, cultural and educational needs considered and patient is agreeable to the plan of care and goals as stated below:     Anticipated Barriers for therapy: Increased Mobility of the Shoulder      Medical Necessity is demonstrated by the following  History  Co-morbidities and personal factors that may impact the plan of care [x] LOW: no personal factors / co-morbidities  [] MODERATE: 1-2 personal factors / co-morbidities  [] HIGH: 3+ personal factors / co-morbidities    Moderate / High Support Documentation:   Co-morbidities affecting plan of care:          Examination  Body Structures and Functions, activity limitations and participation restrictions that may impact the plan of care [] LOW: addressing 1-2 elements  [x] MODERATE: 3+ elements  [] HIGH: 4+ elements (please support below)    Moderate / High Support Documentation: Shoulder Range of Motion, Cleaning Tasks, Reaching      Clinical Presentation [] LOW: stable  [x] MODERATE: Evolving  [] HIGH: Unstable     Decision Making/ Complexity Score: low       Goals:  Short Term Goals (4 Weeks):   1. Pt will be independent with HEP to supplement PT in improving functional use of R UE.  2. Pt will increase pain free shoulder elevation AROM to >/= 160 deg to improve functional mobility of UE  3. Pt will increase shoulder ER PROM in 0 deg abduction to >/=75 deg to improve functional mobility of UE  4. Pt will improve shoulder MMTs by 1/2 grade to promote equal use of B UEs in performing functional tasks.  Long Term Goals (8 Weeks):   1. Pt will improve FOTO score to </=TBD% limited to decrease perceived limitation with carrying, moving, and handling objects.  2. Pt will increase shoulder AROM to WFL in all planes to improve functional use of R RUE.  3. Pt will improve shoulder MMTs by 1 grade to promote equal use of B UEs in performing functional tasks.  5. Pt will lift 10 lb objects without pain to promote functional QOL.  6. Pt to report pain </= 0/10 with ADLs and IADLs using UE to improve functional QOL.   Plan     Plan of care Certification: 12/9/2024 to 2/9/2024.    Outpatient Physical Therapy 2 times weekly for 8 weeks to include the following  interventions: Cervical/Lumbar Traction, Electrical Stimulation , Gait Training, Manual Therapy, Moist Heat/ Ice, Neuromuscular Re-ed, Orthotic Management and Training, Patient Education, Self Care, Therapeutic Activities, and Therapeutic Exercise.     Raoul Brewer, PT

## 2024-12-11 NOTE — TELEPHONE ENCOUNTER
----- Message from Jonathon sent at 12/11/2024  2:04 PM CST -----  Contact: [phone]@[email or web address]  Patient is returning a phone call. Yes     Who left a message for the patient: Yelena Avelar MA    Does patient know what this is regarding:      Would you like a call back, or a response through your [facility] portal?:   Shailesh back     Comments: Patient would like call back after 2pm

## 2024-12-11 NOTE — TELEPHONE ENCOUNTER
Pt is curious when she should stop her over the counter vitamins before she start the prescription ergocalciferol (VITAMIN D2) 50,000 unit Cap   she just don't want to over do it patient is requesting the provider or nurse to reach out to her. Pt also wants to discuss her echo exam as well. Pt does has an upcoming virtual.

## 2024-12-13 ENCOUNTER — OFFICE VISIT (OUTPATIENT)
Dept: INTERNAL MEDICINE | Facility: CLINIC | Age: 34
End: 2024-12-13
Payer: COMMERCIAL

## 2024-12-13 DIAGNOSIS — R01.1 HEART MURMUR: ICD-10-CM

## 2024-12-13 DIAGNOSIS — M25.512 CHRONIC LEFT SHOULDER PAIN: ICD-10-CM

## 2024-12-13 DIAGNOSIS — G89.29 CHRONIC LEFT SHOULDER PAIN: ICD-10-CM

## 2024-12-13 DIAGNOSIS — R19.7 DIARRHEA, UNSPECIFIED TYPE: ICD-10-CM

## 2024-12-13 DIAGNOSIS — E55.9 VITAMIN D DEFICIENCY: Primary | ICD-10-CM

## 2024-12-13 NOTE — PROGRESS NOTES
Subjective:     Patient ID: Ermelinda Trivedi is a 34 y.o. female.   Chief Complaint: No chief complaint on file.    HPI:  The patient location is: [place]  The chief complaint leading to consultation is: follow-up    Visit type: audiovisual    Face to Face time with patient: 13 min  27 minutes of total time spent on the encounter, which includes face to face time and non-face to face time preparing to see the patient (eg, review of tests), Obtaining and/or reviewing separately obtained history, Documenting clinical information in the electronic or other health record, Independently interpreting results (not separately reported) and communicating results to the patient/family/caregiver, or Care coordination (not separately reported).         Each patient to whom he or she provides medical services by telemedicine is:  (1) informed of the relationship between the physician and patient and the respective role of any other health care provider with respect to management of the patient; and (2) notified that he or she may decline to receive medical services by telemedicine and may withdraw from such care at any time.    Notes:   History of Present Illness    CHIEF COMPLAINT:  Patient presents today for follow-up of several issues.    She reports persistent diarrhea since her menstrual cycle ended, which is inconsistent with her typical pattern of GI upset around menstruation. She describes loose, non-watery, non-bloody stools with associated epigastric and sub-epigastric pain.    She reports continued decreased range of motion and weakness in left shoulder. Physical therapy evaluation suggests impingement syndrome and possible micro rotator cuff tear.    She recently had echo after discovery of new murmur. This study was entirely normal. She denies dyspnea on exertion, fluid retention, and chest pain. She inquires about need for repeat echocardiogram testing due to heart murmur and its potential impact on other chronic  conditions.    She started OTC vitamin D supplementation before realizing a high-dose was prescribed. She expresses concern about vitamin D toxicity and inquires about alternative sources including vitamin D enriched foods and sun exposure.    Thyroid studies (thyroid peroxidase antibody, thyroid globulin antibody screen, free T4, TSH), ferritin, B12, and folate were normal. Vitamin D level was 20.         Review of Systems   Constitutional:  Negative for activity change and unexpected weight change.   HENT:  Negative for hearing loss, rhinorrhea and trouble swallowing.    Eyes:  Negative for discharge and visual disturbance.   Respiratory:  Negative for chest tightness and wheezing.    Cardiovascular:  Negative for chest pain and palpitations.   Gastrointestinal:  Positive for diarrhea. Negative for blood in stool, constipation and vomiting.   Endocrine: Negative for polydipsia and polyuria.   Genitourinary:  Negative for difficulty urinating, dysuria, hematuria and menstrual problem.   Musculoskeletal:  Negative for arthralgias, joint swelling and neck pain.   Neurological:  Negative for weakness and headaches.   Psychiatric/Behavioral:  Negative for confusion and dysphoric mood.           Objective:      There were no vitals filed for this visit.   Physical Exam  Constitutional:       General: She is not in acute distress.     Appearance: Normal appearance. She is not ill-appearing.   Neurological:      General: No focal deficit present.      Mental Status: She is alert and oriented to person, place, and time. Mental status is at baseline.      Cranial Nerves: No dysarthria.   Psychiatric:         Mood and Affect: Mood normal.         Behavior: Behavior normal.         Thought Content: Thought content normal.         Judgment: Judgment normal.     As this visit was accomplished virtually, physical exam is limited only to what may be observed via the telehealth audiovisual connection.      Assessment/Plan:              ICD-10-CM ICD-9-CM   1. Vitamin D deficiency  E55.9 268.9   2. Heart murmur  R01.1 785.2     No orders of the defined types were placed in this encounter.      Assessment & Plan    - Evaluated repeat labs for hypothyroidism: normal thyroid peroxidase antibody, thyroid globulin antibody screen, free T4, and TSH. Patient is euthyroid.  - Reviewed echocardiogram results: normal with EF 65-70%, no significant valve regurgitation or dysfunction.  - Assessed recent onset diarrhea: considered hormonal shifts post-menstruation and possible acute gastroenteritis.  - Noted left shoulder decreased range of motion and weakness: physical therapy evaluation suggests potential impingement syndrome and possible micro rotator cuff tear.    1. Vitamin D deficiency (Primary)  Discussed supplement and safety of using high dose when taken as prescribed.  Ok to start high dose without waiting period after lower dose.  May continue to consume vitamin D fortified foods.  Recheck levels after 12 weeks of therapy.    2. Heart murmur  Normal TTE; no need for follow-up    3. Diarrhea, unspecified type  Suspect AGE vs postmenstrual  Monitor and focus on hydration  If no improvement or if there is worsening, RTC for in-person eval    4. Chronic left shoulder pain  Agree with PT recs  F/u pt as scheduled  Consider further eval pending efficacy                  No follow-ups on file.     Chadd Lynn MD, FAAFP  Family Medicine Physician  [facility]  12/13/2024      This note was generated with the assistance of ambient listening technology. Verbal consent was obtained by the patient and accompanying visitor(s) for the recording of patient appointment to facilitate this note. I attest to having reviewed and edited the generated note for accuracy, though some syntax or spelling errors may persist. Please contact the author of this note for any clarification.

## 2025-01-07 ENCOUNTER — PATIENT MESSAGE (OUTPATIENT)
Dept: REHABILITATION | Facility: HOSPITAL | Age: 35
End: 2025-01-07
Payer: COMMERCIAL

## 2025-01-09 ENCOUNTER — CLINICAL SUPPORT (OUTPATIENT)
Dept: REHABILITATION | Facility: HOSPITAL | Age: 35
End: 2025-01-09
Payer: COMMERCIAL

## 2025-01-09 DIAGNOSIS — M53.84 DECREASED RANGE OF MOTION OF THORACIC SPINE: ICD-10-CM

## 2025-01-09 DIAGNOSIS — M89.9 SCAPULAR DYSFUNCTION: ICD-10-CM

## 2025-01-09 DIAGNOSIS — R29.898 WEAKNESS OF LEFT SHOULDER: ICD-10-CM

## 2025-01-09 DIAGNOSIS — M25.612 DECREASED RANGE OF MOTION OF LEFT SHOULDER: Primary | ICD-10-CM

## 2025-01-09 PROCEDURE — 97110 THERAPEUTIC EXERCISES: CPT

## 2025-01-09 PROCEDURE — 97112 NEUROMUSCULAR REEDUCATION: CPT

## 2025-01-09 PROCEDURE — 97140 MANUAL THERAPY 1/> REGIONS: CPT

## 2025-01-09 NOTE — PROGRESS NOTES
[facility] OUTPATIENT THERAPY AND WELLNESS   Physical Therapy Treatment Note      Name: Ermelinda Trivedi  Clinic Number: [id number]    Therapy Diagnosis: No diagnosis found.  Physician: Chadd Lynn MD    Visit Date: 1/9/2025  Physician Orders: PT Eval and Treat   Medical Diagnosis from Referral:   M25.512,G89.29 (ICD-10-CM) - Chronic left shoulder pain   Evaluation Date: 12/9/2024  Authorization Period Expiration: 12/31/24  Plan of Care Expiration: 2/9/2024  Progress Note Due: 1/9/2024 ** Next Session   Visit # / Visits authorized: 1/1   FOTO: 1/3    PTA Visit #: 0/5     Precautions: Standard     Time In: 4:13 pm   Time Out: 5:06 pm   Total Billable Time: 53 minutes    Subjective     Pt reports: That she had some additional things appear in December which is why she cancelled her appointment but now that it is a new year she wants to really focus on trying to improver her shoulder pain.  She has a lot of pain in the shoulder in the mornings and with different movements and she has now noticed some of her forward head posture.      She was not compliant with home exercise program.  Response to previous treatment: Initial Eval   Functional change: No Change     Pain: 5/10  Location: Left Shoulder      Objective      Objective Measures updated at progress report unless specified.     Treatment     Ermelinda received the treatments listed below:      therapeutic exercises to develop strength, endurance, ROM, flexibility, and core stabilization for 8 minutes including:    UBE 1 Min FWD and 1 Min BWD 8 Minutes     manual therapy techniques: The techniques below were applied for 16 minutes:     Posterior Glide Grade III-IV   Inferior Ponce Grade II-III  Centering Ponce with ER/IR MET     neuromuscular re-education activities to improve: Balance, Coordination, Kinesthetic, Sense, Proprioception, and Posture for 29 minutes. The following activities were included:    Rhythmic Stabilization at 90/90 ER/IR 3 x 30   Rhythmic  Stabilization 3 x 30   Shoulder Isometrics 10 x 10 Sec      - Flexion      - Extension      - External Rotation      - Internal Rotation     therapeutic activities to improve functional performance for 0 minutes, including:          hot pack for 0 minutes to .    cold pack for 0 minutes to .    Patient Education and Home Exercises       Education provided:   - HEP   - Plan of Care   - Explanation of Findings       Written Home Exercises Provided: Patient instructed to cont prior HEP. Exercises were reviewed and Ermelinda was able to demonstrate them prior to the end of the session.  Ermelinda demonstrated good  understanding of the education provided. See EMR under Patient Instructions for exercises provided during therapy sessions    Assessment     Ermelinda presents to physical therapy following a month long hiatus due to personal matters.  She continues to have high levels of pain and irritation in the shoulder she worked to improve centering glide and control after attempting to establish improved controlled mobility of the shoulder.  Patient was too irritable to complete interventions with motions moving away from the body and worked to complete isometrics for stability training in the shoulder in safe and pain free motion but did have some issues in external rotation.  She will continue to work to improve periscapular and rotator cuff facilitation and will be progressed as appropriate.      Ermelinda Is progressing well towards her goals.   Pt prognosis is Good.     Pt will continue to benefit from skilled outpatient physical therapy to address the deficits listed in the problem list box on initial evaluation, provide pt/family education and to maximize pt's level of independence in the home and community environment.     Pt's spiritual, cultural and educational needs considered and pt agreeable to plan of care and goals.     Anticipated barriers to physical therapy: Hypermobility of the Shoulder     Goals:  Short  Term Goals (4 Weeks):   1. Pt will be independent with HEP to supplement PT in improving functional use of R UE.  2. Pt will increase pain free shoulder elevation AROM to >/= 160 deg to improve functional mobility of UE  3. Pt will increase shoulder ER PROM in 0 deg abduction to >/=75 deg to improve functional mobility of UE  4. Pt will improve shoulder MMTs by 1/2 grade to promote equal use of B UEs in performing functional tasks.  Long Term Goals (8 Weeks):   1. Pt will improve FOTO score to </=TBD% limited to decrease perceived limitation with carrying, moving, and handling objects.  2. Pt will increase shoulder AROM to WFL in all planes to improve functional use of R RUE.  3. Pt will improve shoulder MMTs by 1 grade to promote equal use of B UEs in performing functional tasks.  5. Pt will lift 10 lb objects without pain to promote functional QOL.  6. Pt to report pain </= 0/10 with ADLs and IADLs using UE to improve functional QOL.   Plan      Plan of care Certification: 12/9/2024 to 2/9/2024.     Outpatient Physical Therapy 2 times weekly for 8 weeks to include the following interventions: Cervical/Lumbar Traction, Electrical Stimulation , Gait Training, Manual Therapy, Moist Heat/ Ice, Neuromuscular Re-ed, Orthotic Management and Training, Patient Education, Self Care, Therapeutic Activities, and Therapeutic Exercise.        Raoul Brewer, PT

## 2025-01-09 NOTE — PROGRESS NOTES
"[facility]   Physical Therapy Treatment Note      Name: Ermelinda Trivedi  Clinic Number: [id number]    Therapy Diagnosis: No diagnosis found.  Physician: Chadd Lynn MD    Visit Date: 1/9/2025  Physician Orders: PT Eval and Treat   Medical Diagnosis from Referral:   M25.512,G89.29 (ICD-10-CM) - Chronic left shoulder pain   Evaluation Date: 12/9/2024  Authorization Period Expiration: 12/31/24  Plan of Care Expiration: 2/9/2024  Progress Note Due: 1/9/2024 ***  Visit # / Visits authorized: 1/1   FOTO: 1/3    PTA Visit #: 0/5     Precautions: Standard     Time In: ***  Time Out: ***  Total Billable Time: *** minutes    Subjective     Pt reports: ***.    She {Actions; was/was not:80043} compliant with home exercise program.  Response to previous treatment: ***  Functional change: ***    Pain: {0-10:22770::"0"}/10  Location: {RIGHT/LEFT/BILATERAL:77584} {LOCATION ON BODY:57989}     Objective      Objective Measures updated at progress report unless specified.     Treatment     Ermelinda received the treatments listed below:      therapeutic exercises to develop strength, endurance, ROM, flexibility, and core stabilization for *** minutes including:        manual therapy techniques: The techniques below were applied for *** minutes:         neuromuscular re-education activities to improve: Balance, Coordination, Kinesthetic, Sense, Proprioception, and Posture for *** minutes. The following activities were included:        therapeutic activities to improve functional performance for ***  minutes, including:          hot pack for 0 minutes to .    cold pack for 0 minutes to .    Patient Education and Home Exercises       Education provided:   - HEP   - Plan of Care   - Explanation of Findings       Written Home Exercises Provided: Patient instructed to cont prior HEP. Exercises were reviewed and Ermelinda was able to demonstrate them prior to the end of the session.  Ermelinda demonstrated good  " understanding of the education provided. See EMR under Patient Instructions for exercises provided during therapy sessions    Assessment     Ermelinda presents to physical therapy following a month long hiatus due to ***    Kiraxtin {IS/IS NOT:76169} progressing well towards her goals.   Pt prognosis is Good.     Pt will continue to benefit from skilled outpatient physical therapy to address the deficits listed in the problem list box on initial evaluation, provide pt/family education and to maximize pt's level of independence in the home and community environment.     Pt's spiritual, cultural and educational needs considered and pt agreeable to plan of care and goals.     Anticipated barriers to physical therapy: Hypermobility of the Shoulder     Goals:  Short Term Goals (4 Weeks):   1. Pt will be independent with HEP to supplement PT in improving functional use of R UE.  2. Pt will increase pain free shoulder elevation AROM to >/= 160 deg to improve functional mobility of UE  3. Pt will increase shoulder ER PROM in 0 deg abduction to >/=75 deg to improve functional mobility of UE  4. Pt will improve shoulder MMTs by 1/2 grade to promote equal use of B UEs in performing functional tasks.  Long Term Goals (8 Weeks):   1. Pt will improve FOTO score to </=TBD% limited to decrease perceived limitation with carrying, moving, and handling objects.  2. Pt will increase shoulder AROM to WFL in all planes to improve functional use of R RUE.  3. Pt will improve shoulder MMTs by 1 grade to promote equal use of B UEs in performing functional tasks.  5. Pt will lift 10 lb objects without pain to promote functional QOL.  6. Pt to report pain </= 0/10 with ADLs and IADLs using UE to improve functional QOL.   Plan      Plan of care Certification: 12/9/2024 to 2/9/2024.     Outpatient Physical Therapy 2 times weekly for 8 weeks to include the following interventions: Cervical/Lumbar Traction, Electrical Stimulation , Gait Training,  Manual Therapy, Moist Heat/ Ice, Neuromuscular Re-ed, Orthotic Management and Training, Patient Education, Self Care, Therapeutic Activities, and Therapeutic Exercise.        Raoul Brewer, PT

## 2025-01-14 ENCOUNTER — CLINICAL SUPPORT (OUTPATIENT)
Dept: REHABILITATION | Facility: HOSPITAL | Age: 35
End: 2025-01-14
Payer: COMMERCIAL

## 2025-01-14 DIAGNOSIS — M53.84 DECREASED RANGE OF MOTION OF THORACIC SPINE: ICD-10-CM

## 2025-01-14 DIAGNOSIS — R29.898 WEAKNESS OF LEFT SHOULDER: ICD-10-CM

## 2025-01-14 DIAGNOSIS — M89.9 SCAPULAR DYSFUNCTION: ICD-10-CM

## 2025-01-14 DIAGNOSIS — M25.612 DECREASED RANGE OF MOTION OF LEFT SHOULDER: Primary | ICD-10-CM

## 2025-01-14 PROCEDURE — 97110 THERAPEUTIC EXERCISES: CPT

## 2025-01-14 PROCEDURE — 97112 NEUROMUSCULAR REEDUCATION: CPT

## 2025-01-14 PROCEDURE — 97140 MANUAL THERAPY 1/> REGIONS: CPT

## 2025-01-14 NOTE — PROGRESS NOTES
[facility]   Physical Therapy Treatment Note      Name: Ermelinda Trivedi  [facility] Number: [id number]    Therapy Diagnosis:   Encounter Diagnoses   Name Primary?    Decreased range of motion of left shoulder Yes    Weakness of left shoulder     Scapular dysfunction     Decreased range of motion of thoracic spine      Physician: hCadd Lynn MD    Visit Date: 1/14/2025  Physician Orders: PT Eval and Treat   Medical Diagnosis from Referral:   M25.512,G89.29 (ICD-10-CM) - Chronic left shoulder pain   Evaluation Date: 12/9/2024  Authorization Period Expiration: 12/31/24  Plan of Care Expiration: 2/9/2024  Progress Note Due: 1/9/2024 ** Next Session   Visit # / Visits authorized: 1/1   FOTO: 1/3    PTA Visit #: 0/5     Precautions: Standard     Time In: 4:10 pm   Time Out: 5:27 pm   Total Billable Time: 77 minutes    Subjective     Pt reports: That she really typically feels okay but then randomly gets pain in the shoulder and arm particularly in the mornings or with different positions but the pain does radiate into the arm.  She does now remember feeling a little tingling in the hand and some into the jaw.     She was not compliant with home exercise program.  Response to previous treatment: Initial Eval   Functional change: No Change     Pain: 5/10  Location: Left Shoulder      Objective      Objective Measures updated at progress report unless specified.     Adverse Neural Testing:      - Positive Median Nerve Testing      - Positive Radian Nerve Testing      - Positive (Not Relevant) Ulnar Nerve Testing     Joint Mobility:      - Decreased Upper Cervical Rotation Right      - Decreased Side Glides (Right)      - Slight Decreased OA Motion     Positive Shoulder Abduction Test    Treatment     Ermelinda received the treatments listed below:      therapeutic exercises to develop strength, endurance, ROM, flexibility, and core stabilization for 37 minutes including:    UBE 1 Min FWD and 1 Min BWD  8 Minutes   Assessment as Above   Seated Thoracic Extension 1 x 15 5 Sec Holds   Patient Education     manual therapy techniques: The techniques below were applied for 16 minutes:     Cervical Side Glides Grade II-III (Right > Left)   OA Flexion Grade III  OA Side Bending III  Upper Cervical Rotation MET (Right)   Soft Tissue Mobilization to Cervical Paraspinals   Sub Occipital Release   Posterior Glide Grade III-IV     Not Today:   Inferior [place] Grade II-III  Centering [place] with ER/IR MET     neuromuscular re-education activities to improve: Balance, Coordination, Kinesthetic, Sense, Proprioception, and Posture for 24 minutes. The following activities were included:    Chin Tuck with Biofeedback 1 x 10 10 Sec Holds   Chin Tuck with Biofeedback 1 x 12 1#   Side Lying External Rotation 1-2# 3 x 10 2 Sec Holds   Serratus Wall Slides 2 x 12     Not Today:   Rhythmic Stabilization at 90/90 ER/IR 3 x 30   Rhythmic Stabilization 3 x 30   Shoulder Isometrics 10 x 10 Sec      - Flexion      - Extension      - External Rotation      - Internal Rotation     therapeutic activities to improve functional performance for 0 minutes, including:        hot pack for 0 minutes to .    cold pack for 0 minutes to .    Patient Education and Home Exercises       Education provided:   - HEP   - Plan of Care   - Explanation of Findings       Written Home Exercises Provided: Patient instructed to cont prior HEP. Exercises were reviewed and Ermelinda was able to demonstrate them prior to the end of the session.  Ermelinda demonstrated good  understanding of the education provided. See EMR under Patient Instructions for exercises provided during therapy sessions    Assessment     Ermelinda presents to physical therapy with continued pain in the shoulder.  Following continued conversation about pain presentation, adverse neural tension testing assessment was performed and she had notable positive findings but general positive testing in the arm  indicating further irritation in the nervous that is contributing to pain in the upper extremity.  Postural assessment and assessment of sleeping position showed significant tendency to round her shoulder forward and allow for significant anterior humeral head shearing.  When artificially placed into position there was recreation of patient's symptoms and with manual scapular and shoulder positioning, irritation began to decline.  Cervical assessment did show significant limitations of right side glides most notable at C5-C6 and resulting muscle guarding and tenderness.  Manual interventions were performed to address limited cervical spine and worked to improve neural mobility.  Patient was educated on improving postures and general postural modifications with sleeping and throughout the day.  Patient worked to improve deep neck flexor activation and control to further offload the cervical spine and resulting neural mobility.  She worked to improve periscapular strengthening and rotator cuff strengthening to further improve shoulder stability and worked to offload irritation while building control and stability.  She will continue to work to improve thoracic and cervical mobility to address adverse neural tension and will build control of the scapular and deep neck flexors.  She will be progressed as appropriate.      Crixtin Is progressing well towards her goals.   Pt prognosis is Good.     Pt will continue to benefit from skilled outpatient physical therapy to address the deficits listed in the problem list box on initial evaluation, provide pt/family education and to maximize pt's level of independence in the home and community environment.     Pt's spiritual, cultural and educational needs considered and pt agreeable to plan of care and goals.     Anticipated barriers to physical therapy: Hypermobility of the Shoulder     Goals:  Short Term Goals (4 Weeks):   1. Pt will be independent with HEP to supplement PT  in improving functional use of R UE.  2. Pt will increase pain free shoulder elevation AROM to >/= 160 deg to improve functional mobility of UE  3. Pt will increase shoulder ER PROM in 0 deg abduction to >/=75 deg to improve functional mobility of UE  4. Pt will improve shoulder MMTs by 1/2 grade to promote equal use of B UEs in performing functional tasks.  Long Term Goals (8 Weeks):   1. Pt will improve FOTO score to </=TBD% limited to decrease perceived limitation with carrying, moving, and handling objects.  2. Pt will increase shoulder AROM to WFL in all planes to improve functional use of R RUE.  3. Pt will improve shoulder MMTs by 1 grade to promote equal use of B UEs in performing functional tasks.  5. Pt will lift 10 lb objects without pain to promote functional QOL.  6. Pt to report pain </= 0/10 with ADLs and IADLs using UE to improve functional QOL.   Plan      Plan of care Certification: 12/9/2024 to 2/9/2024.     Outpatient Physical Therapy 2 times weekly for 8 weeks to include the following interventions: Cervical/Lumbar Traction, Electrical Stimulation , Gait Training, Manual Therapy, Moist Heat/ Ice, Neuromuscular Re-ed, Orthotic Management and Training, Patient Education, Self Care, Therapeutic Activities, and Therapeutic Exercise.        Raoul Brewer, PT

## 2025-01-23 ENCOUNTER — PATIENT MESSAGE (OUTPATIENT)
Dept: REHABILITATION | Facility: HOSPITAL | Age: 35
End: 2025-01-23
Payer: COMMERCIAL

## 2025-01-24 ENCOUNTER — PATIENT MESSAGE (OUTPATIENT)
Dept: INTERNAL MEDICINE | Facility: CLINIC | Age: 35
End: 2025-01-24
Payer: COMMERCIAL

## 2025-01-24 NOTE — TELEPHONE ENCOUNTER
11/20/2024 Vit D result comment provider states to take med for 12 weeks and recheck levels at the end of the 12 weeks

## 2025-01-29 ENCOUNTER — LAB VISIT (OUTPATIENT)
Dept: LAB | Facility: HOSPITAL | Age: 35
End: 2025-01-29
Attending: FAMILY MEDICINE
Payer: COMMERCIAL

## 2025-01-29 DIAGNOSIS — E55.9 VITAMIN D DEFICIENCY: ICD-10-CM

## 2025-01-29 LAB — 25(OH)D3+25(OH)D2 SERPL-MCNC: 33 NG/ML (ref 30–96)

## 2025-01-29 PROCEDURE — 36415 COLL VENOUS BLD VENIPUNCTURE: CPT | Performed by: FAMILY MEDICINE

## 2025-01-29 PROCEDURE — 82306 VITAMIN D 25 HYDROXY: CPT | Performed by: FAMILY MEDICINE

## 2025-02-03 ENCOUNTER — TELEPHONE (OUTPATIENT)
Dept: BEHAVIORAL HEALTH | Facility: CLINIC | Age: 35
End: 2025-02-03
Payer: MEDICAID

## 2025-02-03 NOTE — PROGRESS NOTES
Behavioral Health Community Health Worker  Initial Assessment  Completed by:  Alen Chauhan    Date:  2/3/2025    Patient Enrollment in Behavioral Health Program:  Patient verbalized understanding of Behavioral Health Integration services to include:  Patient understands that CHW, LCSW, PharmD and consulting Psychiatrist are members of the care team working collaboratively with his/her primary care provider: Yes  Patient understands that activation of their [facility] patient portal account is required for accessing the full scope of team services: Yes  Patient understands that some counseling sessions may occur via video: Yes  Clinic visits with the psychiatrist may be subject to a co-pay based on your insurance: Yes  Patient consents to enroll in BHI program: Yes    Assessments     Single Item Health Literacy Scale:  How often do you need to have someone help you read instructions, pamphlets or other written material from your doctor or pharmacy?: Never    Promis 10:  Promis 10 Responses  In general, would you say your health is: Very good  In general, would you say your quality of life is: Very good  In general, how would you rate your physical health?: Very good  In general, how would you rate your mental health, including your mood and your ability to think?: Fair  In general, how would you rate your satisfaction with your social activities and relationships?: Good  In general, please rate how well you carry out your usual social activities and roles. (This includes activities at home, at work and in your community, and responsibilities as a parent, child, spouse, employee, friend, etc.): Very good  To what extent are you able to carry out your everyday physical activities such as walking, climbing stairs, carrying groceries, or moving a chair? : Completely  How often have you been bothered by emotional problems such as feeling anxious, depressed or irritable?: Always  In the past 7 days, how would you rate  your fatigue on average?: Moderate  In the past 7 days, on a scale of 0 to 10 (where 0 is no pain and 10 is the worst pain imaginable) how would you rate your pain on average?: 5  Global Physical Health: 17  Global Mental health Score: 14    Depression PHQ:      2/3/2025     1:21 PM   PHQ9   Little interest or pleasure in doing things 0   Feeling down, depressed, or hopeless 0   Trouble falling or staying asleep, or sleeping too much 1   Feeling tired or having little energy 1   Poor appetite or overeating 1   Feeling bad about yourself - or that you are a failure or have let yourself or your family down 1   Trouble concentrating on things, such as reading the newspaper or watching television 1   Moving or speaking so slowly that other people could have noticed. Or the opposite - being so fidgety or restless that you have been moving around a lot more than usual 0   PHQ-9 Total Score 5         Generalized Anxiety Disorder 7-Item Scale:      2/3/2025     1:23 PM   GAD7   1. Feeling nervous, anxious, or on edge? 3   2. Not being able to stop or control worrying? 3   3. Worrying too much about different things? 3   4. Trouble relaxing? 2   5. Being so restless that it is hard to sit still? 1   6. Becoming easily annoyed or irritable? 3   7. Feeling afraid as if something awful might happen? 3   8. If you checked off any problems, how difficult have these problems made it for you to do your work, take care of things at home, or get along with other people? 1   EUGENE-7 Score 18       History     Social History     Socioeconomic History    Marital status: Single   Tobacco Use    Smoking status: Never    Smokeless tobacco: Never   Substance and Sexual Activity    Alcohol use: Never    Drug use: Never     Social Drivers of Health     Financial Resource Strain: Patient Declined (10/9/2024)    Received from [facility]    Overall Financial Resource Strain (CARDIA)     Difficulty of Paying Living Expenses: Patient declined  "  Food Insecurity: Patient Declined (10/9/2024)    Received from [facility]    Hunger Vital Sign     Worried About Running Out of Food in the Last Year: Patient declined     Ran Out of Food in the Last Year: Patient declined   Transportation Needs: Patient Declined (10/9/2024)    Received from [facility]    PRAPARE - Transportation     Lack of Transportation (Medical): Patient declined     Lack of Transportation (Non-Medical): Patient declined   Physical Activity: Unknown (10/9/2024)    Received from [facility]    Exercise Vital Sign     Days of Exercise per Week: Patient declined   Recent Concern: Physical Activity - Insufficiently Active (9/1/2024)    Exercise Vital Sign     Days of Exercise per Week: 2 days     Minutes of Exercise per Session: 30 min   Stress: Patient Declined (10/9/2024)    [place] [facility]    [employer] - Occupational Stress Questionnaire     Feeling of Stress : Patient declined   Recent Concern: Stress - Stress Concern Present (9/1/2024)    [employer] - Occupational Stress Questionnaire     Feeling of Stress : To some extent   Housing Stability: Unknown (10/9/2024)    Received from [facility]    Housing Stability Vital Sign     Unable to Pay for Housing in the Last Year: Patient declined       Call Summary   Patient was referred to the BHI (Non-opioid) program by Primary Care Provider, Dr. Lynn W contacted Ermelinda Trivedi who reports anxiety that limits [his/her] activities of daily living (ADLs).   Patient scored "5" on the PHQ9 and "18" on the EUGENE 7. Based on these scores patient is eligible for the Behavioral health Integration (Non-opioid) Program. CHW completed the intake and scheduled an appointment for patient with Bella Marie LCSW, on 3/20/25.          "

## 2025-02-04 ENCOUNTER — CLINICAL SUPPORT (OUTPATIENT)
Dept: REHABILITATION | Facility: HOSPITAL | Age: 35
End: 2025-02-04
Payer: MEDICAID

## 2025-02-04 DIAGNOSIS — M53.84 DECREASED RANGE OF MOTION OF THORACIC SPINE: ICD-10-CM

## 2025-02-04 DIAGNOSIS — M25.612 DECREASED RANGE OF MOTION OF LEFT SHOULDER: Primary | ICD-10-CM

## 2025-02-04 DIAGNOSIS — R29.898 WEAKNESS OF LEFT SHOULDER: ICD-10-CM

## 2025-02-04 DIAGNOSIS — M89.9 SCAPULAR DYSFUNCTION: ICD-10-CM

## 2025-02-04 PROCEDURE — 97110 THERAPEUTIC EXERCISES: CPT

## 2025-02-04 PROCEDURE — 97140 MANUAL THERAPY 1/> REGIONS: CPT

## 2025-02-04 PROCEDURE — 97112 NEUROMUSCULAR REEDUCATION: CPT

## 2025-02-04 NOTE — PROGRESS NOTES
[facility]   Physical Therapy Treatment Note      Name: Ermelinda Trivedi  [facility] Number: [id number]    Therapy Diagnosis:   Encounter Diagnoses   Name Primary?    Decreased range of motion of left shoulder Yes    Weakness of left shoulder     Scapular dysfunction     Decreased range of motion of thoracic spine      Physician: Chadd Lynn MD    Visit Date: 2/4/2025  Physician Orders: PT Eval and Treat   Medical Diagnosis from Referral:   M25.512,G89.29 (ICD-10-CM) - Chronic left shoulder pain   Evaluation Date: 12/9/2024  Authorization Period Expiration: 12/31/24  Plan of Care Expiration: 2/9/2024  Progress Note Due: 1/9/2024 ** Next Session   Visit # / Visits authorized: 3/16   FOTO: 1/3  PTA Visit #: 0/5   Precautions: Standard   Time In: 5:08 pm   Time Out: 6:04 pm   Total Billable Time: 56 minutes    Subjective   Pt reports: That she has been able to sleep on it a little bit more and overall is better but she is still feeling pain in the shoulder.  She thinks that she has some pain in the neck but it has improved following the last session when we worked on the neck.  She hasn't been as compliant with her exercises as she should have been.      She was not compliant with home exercise program.  Response to previous treatment: Initial Eval   Functional change: No Change     Pain: 5/10  Location: Left Shoulder      Objective      Objective Measures updated at progress report unless specified.     Adverse Neural Testing:      - Positive Median Nerve Testing      - Positive Radian Nerve Testing      - Positive (Not Relevant) Ulnar Nerve Testing     Joint Mobility:      - Decreased Upper Cervical Rotation Right      - Decreased Side Glides (Right)      - Slight Decreased OA Motion     Positive Shoulder Abduction Test    Treatment     Ermelinda received the treatments listed below:      therapeutic exercises to develop strength, endurance, ROM, flexibility, and core stabilization for 11  minutes including:    UBE 1 Min FWD and 1 Min BWD 8 Minutes   Patient Education     Assessment as Above   Seated Thoracic Extension 1 x 15 5 Sec Holds     manual therapy techniques: The techniques below were applied for 16 minutes:     Cervical Side Glides Grade II-III (Right > Left)   OA Flexion Grade III  OA Side Bending III  Upper Cervical Rotation MET (Right)   Soft Tissue Mobilization to Cervical Paraspinals   Sub Occipital Release   Posterior Glide Grade III-IV   Inferior [place] Grade II-III    Not Today:   Centering [place] with ER/IR MET     neuromuscular re-education activities to improve: Balance, Coordination, Kinesthetic, Sense, Proprioception, and Posture for 29 minutes. The following activities were included:    Chin Tuck with Biofeedback 1 x 10 10 Sec Holds   Chin Tuck with Biofeedback 1 x 15 1#   Side Lying External Rotation 1-2# 3 x 10 2 Sec Holds   Horizontal Abduction/Ts GTB 2 x 12   Serratus Wall Slides 2 x 12     Not Today:   Rhythmic Stabilization at 90/90 ER/IR 3 x 30   Rhythmic Stabilization 3 x 30   Shoulder Isometrics 10 x 10 Sec      - Flexion      - Extension      - External Rotation      - Internal Rotation     therapeutic activities to improve functional performance for 0 minutes, including:        hot pack for 0 minutes to .    cold pack for 0 minutes to .    Patient Education and Home Exercises       Education provided:   - HEP   - Plan of Care   - Explanation of Findings       Written Home Exercises Provided: Patient instructed to cont prior HEP. Exercises were reviewed and Ermelinda was able to demonstrate them prior to the end of the session.  Ermelinda demonstrated good  understanding of the education provided. See EMR under Patient Instructions for exercises provided during therapy sessions    Assessment   Ermelinda presents to physical therapy with continued pain in the shoulder but does have reports of progress.  There continues to be presentations consistent with adverse neural and  cervical involvements and patient has noted hypomobilities in the cervical spine consistent with levels affecting shoulder and upper arm.  She did continue to build cervical stability and strengthening of deep neck flexors to reinforce mobility gain with manual interventions.  She continues to work to improve rotator cuff and periscapular strengthening and control to provide offloading of the neural tissues and cervical spine while improving postural control.  She tolerated the session and will continue to be progressed as able to address pain levels and functional difficulties.        Crixtin Is progressing well towards her goals.   Pt prognosis is Good.     Pt will continue to benefit from skilled outpatient physical therapy to address the deficits listed in the problem list box on initial evaluation, provide pt/family education and to maximize pt's level of independence in the home and community environment.     Pt's spiritual, cultural and educational needs considered and pt agreeable to plan of care and goals.     Anticipated barriers to physical therapy: Hypermobility of the Shoulder     Goals:  Short Term Goals (4 Weeks):   1. Pt will be independent with HEP to supplement PT in improving functional use of R UE.  2. Pt will increase pain free shoulder elevation AROM to >/= 160 deg to improve functional mobility of UE  3. Pt will increase shoulder ER PROM in 0 deg abduction to >/=75 deg to improve functional mobility of UE  4. Pt will improve shoulder MMTs by 1/2 grade to promote equal use of B UEs in performing functional tasks.  Long Term Goals (8 Weeks):   1. Pt will improve FOTO score to </=TBD% limited to decrease perceived limitation with carrying, moving, and handling objects.  2. Pt will increase shoulder AROM to WFL in all planes to improve functional use of R RUE.  3. Pt will improve shoulder MMTs by 1 grade to promote equal use of B UEs in performing functional tasks.  5. Pt will lift 10 lb objects  without pain to promote functional QOL.  6. Pt to report pain </= 0/10 with ADLs and IADLs using UE to improve functional QOL.   Plan      Plan of care Certification: 12/9/2024 to 2/9/2024.     Outpatient Physical Therapy 2 times weekly for 8 weeks to include the following interventions: Cervical/Lumbar Traction, Electrical Stimulation , Gait Training, Manual Therapy, Moist Heat/ Ice, Neuromuscular Re-ed, Orthotic Management and Training, Patient Education, Self Care, Therapeutic Activities, and Therapeutic Exercise.        Cristy Chester, PT DPT  Board Certified in Orthopedic Physical Therapy

## 2025-02-05 ENCOUNTER — PATIENT MESSAGE (OUTPATIENT)
Dept: REHABILITATION | Facility: HOSPITAL | Age: 35
End: 2025-02-05
Payer: COMMERCIAL

## 2025-02-10 NOTE — PROGRESS NOTES
[facility]   Physical Therapy Treatment Note      Name: Ermelinda Trivedi  [facility] Number: [id number]    Therapy Diagnosis:   Encounter Diagnoses   Name Primary?    Decreased range of motion of left shoulder Yes    Weakness of left shoulder     Scapular dysfunction     Decreased range of motion of thoracic spine      Physician: Chadd Lynn MD    Visit Date: 2/11/2025  Physician Orders: PT Eval and Treat   Medical Diagnosis from Referral:   M25.512,G89.29 (ICD-10-CM) - Chronic left shoulder pain   Evaluation Date: 12/9/2024  Authorization Period Expiration: 12/31/24  Plan of Care Expiration: 2/9/2024  Progress Note Due: 1/9/2024 ** Next Session   Visit # / Visits authorized: 4/16   FOTO: 1/3  PTA Visit #: 0/5   Precautions: Standard   Time In: 5:04 pm   Time Out: 6:09 pm   Total Billable Time: 65 minutes    Subjective   Pt reports: That she has been feeling off for issues other than her neck and shoulder and has just been feeling more stress.  The pain is definitely less but she still has it and it still interferes with sleep and different activities.      She was not compliant with home exercise program.  Response to previous treatment: Initial Eval   Functional change: No Change     Pain: 5/10  Location: Left Shoulder      Objective      Objective Measures updated at progress report unless specified.     Adverse Neural Testing:      - Positive Median Nerve Testing      - Positive Radian Nerve Testing      - Positive (Not Relevant) Ulnar Nerve Testing     Joint Mobility:      - Decreased Upper Cervical Rotation Right      - Decreased Side Glides (Right)      - Slight Decreased OA Motion     Positive Shoulder Abduction Test    Treatment     Ermelinda received the treatments listed below:      therapeutic exercises to develop strength, endurance, ROM, flexibility, and core stabilization for 14 minutes including:    UBE 1 Min FWD and 1 Min BWD 8 Minutes   Patient Education     Assessment as  Above   Seated Thoracic Extension 1 x 15 5 Sec Holds     manual therapy techniques: The techniques below were applied for 14 minutes:     Cervical Side Glides Grade II-III (Right > Left)   OA Flexion Grade III  OA Side Bending III  Supine Thoracic HVLAT   Sub Occipital Release   Posterior Glide Grade III-IV   Inferior [place] Grade II-III  Centering [place] with ER/IR MET     Not Today:   Upper Cervical Rotation MET (Right)   Soft Tissue Mobilization to Cervical Paraspinals     neuromuscular re-education activities to improve: Balance, Coordination, Kinesthetic, Sense, Proprioception, and Posture for 37 minutes. The following activities were included:    Chin Tuck with Biofeedback 1 x 10 10 Sec Holds   Chin Tuck with Biofeedback 1 x 15 3#   Rhythmic Stabilization 3 x 30   Side Lying External Rotation 1-2# 3 x 10 2 Sec Holds   Serratus Wall Slides 2 x 12     Not Today:   Horizontal Abduction/Ts GTB 2 x 12   Rhythmic Stabilization at 90/90 ER/IR 3 x 30   Shoulder Isometrics 10 x 10 Sec      - Flexion      - Extension      - External Rotation      - Internal Rotation     therapeutic activities to improve functional performance for 0 minutes, including:        hot pack for 0 minutes to .    cold pack for 0 minutes to .    Patient Education and Home Exercises       Education provided:   - HEP   - Plan of Care   - Explanation of Findings       Written Home Exercises Provided: Patient instructed to cont prior HEP. Exercises were reviewed and Ermelinda was able to demonstrate them prior to the end of the session.  Ermelinda demonstrated good  understanding of the education provided. See EMR under Patient Instructions for exercises provided during therapy sessions    Assessment   Ermelinda presents to physical therapy with decreased pain and irritation through the neck and shoulder but continues to have irritation through the lateral shoulder and neck.  She continues to present with neural irritation that does begin to resolve with  manual interventions to primarily the cervical spine.  Today added thoracic HVLAT to facilitate neural modulation and improve thoracic mobility to decrease strain placed on the cervical spine.  She worked to reinforce cervical mobility and further offload cervical spine with deep neck flexor strengthening and endurance and was progressed today.  She continued to work to improve control of the humeral head with rotator cuff facilitation interventions and works to improve periscapular strengthening to facilitate upward rotation and clearance of the subacromial space due to primary pain generator being moving into a Stratton-Gabriele position.  She tolerated the session and will continue to be progressed as able to address pain levels and functional difficulties.      Crixtin Is progressing well towards her goals.   Pt prognosis is Good.     Pt will continue to benefit from skilled outpatient physical therapy to address the deficits listed in the problem list box on initial evaluation, provide pt/family education and to maximize pt's level of independence in the home and community environment.     Pt's spiritual, cultural and educational needs considered and pt agreeable to plan of care and goals.     Anticipated barriers to physical therapy: Hypermobility of the Shoulder     Goals:  Short Term Goals (4 Weeks):   1. Pt will be independent with HEP to supplement PT in improving functional use of R UE.  2. Pt will increase pain free shoulder elevation AROM to >/= 160 deg to improve functional mobility of UE  3. Pt will increase shoulder ER PROM in 0 deg abduction to >/=75 deg to improve functional mobility of UE  4. Pt will improve shoulder MMTs by 1/2 grade to promote equal use of B UEs in performing functional tasks.  Long Term Goals (8 Weeks):   1. Pt will improve FOTO score to </=TBD% limited to decrease perceived limitation with carrying, moving, and handling objects.  2. Pt will increase shoulder AROM to WFL in all  planes to improve functional use of R RUE.  3. Pt will improve shoulder MMTs by 1 grade to promote equal use of B UEs in performing functional tasks.  5. Pt will lift 10 lb objects without pain to promote functional QOL.  6. Pt to report pain </= 0/10 with ADLs and IADLs using UE to improve functional QOL.   Plan      Plan of care Certification: 12/9/2024 to 2/9/2024.     Outpatient Physical Therapy 2 times weekly for 8 weeks to include the following interventions: Cervical/Lumbar Traction, Electrical Stimulation , Gait Training, Manual Therapy, Moist Heat/ Ice, Neuromuscular Re-ed, Orthotic Management and Training, Patient Education, Self Care, Therapeutic Activities, and Therapeutic Exercise.        Raoul Brewer PT, DPT

## 2025-02-11 ENCOUNTER — CLINICAL SUPPORT (OUTPATIENT)
Dept: REHABILITATION | Facility: HOSPITAL | Age: 35
End: 2025-02-11
Payer: COMMERCIAL

## 2025-02-11 DIAGNOSIS — R29.898 WEAKNESS OF LEFT SHOULDER: ICD-10-CM

## 2025-02-11 DIAGNOSIS — M25.612 DECREASED RANGE OF MOTION OF LEFT SHOULDER: Primary | ICD-10-CM

## 2025-02-11 DIAGNOSIS — M89.9 SCAPULAR DYSFUNCTION: ICD-10-CM

## 2025-02-11 DIAGNOSIS — M53.84 DECREASED RANGE OF MOTION OF THORACIC SPINE: ICD-10-CM

## 2025-02-11 PROCEDURE — 97140 MANUAL THERAPY 1/> REGIONS: CPT

## 2025-02-11 PROCEDURE — 97112 NEUROMUSCULAR REEDUCATION: CPT

## 2025-02-11 PROCEDURE — 97110 THERAPEUTIC EXERCISES: CPT

## 2025-02-18 ENCOUNTER — CLINICAL SUPPORT (OUTPATIENT)
Dept: REHABILITATION | Facility: HOSPITAL | Age: 35
End: 2025-02-18
Payer: COMMERCIAL

## 2025-02-18 DIAGNOSIS — M53.84 DECREASED RANGE OF MOTION OF THORACIC SPINE: ICD-10-CM

## 2025-02-18 DIAGNOSIS — M89.9 SCAPULAR DYSFUNCTION: ICD-10-CM

## 2025-02-18 DIAGNOSIS — R29.898 WEAKNESS OF LEFT SHOULDER: ICD-10-CM

## 2025-02-18 DIAGNOSIS — M25.612 DECREASED RANGE OF MOTION OF LEFT SHOULDER: Primary | ICD-10-CM

## 2025-02-18 PROCEDURE — 97140 MANUAL THERAPY 1/> REGIONS: CPT

## 2025-02-18 PROCEDURE — 97112 NEUROMUSCULAR REEDUCATION: CPT

## 2025-02-18 PROCEDURE — 97110 THERAPEUTIC EXERCISES: CPT

## 2025-02-18 NOTE — PROGRESS NOTES
[facility] OUTPATIENT THERAPY AND WELLNESS   Physical Therapy Treatment Note      Name: Ermelinda Trivedi  [facility] Number: [id number]    Therapy Diagnosis:   Encounter Diagnoses   Name Primary?    Decreased range of motion of left shoulder Yes    Weakness of left shoulder     Scapular dysfunction     Decreased range of motion of thoracic spine      Physician: Chadd Lynn MD    Visit Date: 2/18/2025  Physician Orders: PT Eval and Treat   Medical Diagnosis from Referral:   M25.512,G89.29 (ICD-10-CM) - Chronic left shoulder pain   Evaluation Date: 12/9/2024  Authorization Period Expiration: 12/31/24  Plan of Care Expiration: 2/9/2024  Progress Note Due: 1/9/2024 ** Next Session   Visit # / Visits authorized: 5/16   FOTO: 1/3  PTA Visit #: 0/5   Precautions: Standard   Time In: 5:10 pm   Time Out: 6:20 pm   Total Billable Time: 70 minutes    Subjective   Pt reports: That she was feeling more pain but thinks it is stress related.  She had some pain down into her forearm after she was finding herself curling up on her side so she has been trying to avoid that.  She does feel like she can sleep a little bit more on her left side.      She was not compliant with home exercise program.  Response to previous treatment: Initial Eval   Functional change: No Change     Pain: 5/10  Location: Left Shoulder      Objective      Objective Measures updated at progress report unless specified.     Adverse Neural Testing:      - Positive Median Nerve Testing      - Positive Radian Nerve Testing      - Positive (Not Relevant) Ulnar Nerve Testing     Joint Mobility:      - Decreased Upper Cervical Rotation Right      - Decreased Side Glides (Right)      - Slight Decreased OA Motion     Positive Shoulder Abduction Test    Treatment     Ermelinda received the treatments listed below:      therapeutic exercises to develop strength, endurance, ROM, flexibility, and core stabilization for 24 minutes including:    UBE 1 Min FWD and 1 Min BWD 8 Minutes    Patient Education     Not Today:   Assessment as Above   Seated Thoracic Extension 1 x 15 5 Sec Holds     manual therapy techniques: The techniques below were applied for 12 minutes:     Cervical Side Glides Grade II-III (Right > Left)   OA Flexion Grade III  OA Side Bending III  Supine Thoracic HVLAT   Sub Occipital Release   Upper Cervical Rotation MET (Right)   Soft Tissue Mobilization to Cervical Paraspinals     Not Today:   Posterior Glide Grade III-IV   Inferior [place] Grade II-III  Centering [place] with ER/IR MET     neuromuscular re-education activities to improve: Balance, Coordination, Kinesthetic, Sense, Proprioception, and Posture for 24 minutes. The following activities were included:    Chin Tuck with Biofeedback 1 x 10 10 Sec Holds   Chin Tuck with Biofeedback 1 x 15 3#   Side Lying External Rotation 2# 3 x 10 2 Sec Holds   Serratus Wall Slides 2 x 12   Upper Trap Level 2 2 x 12 3 Sec Holds     Not Today:   Rhythmic Stabilization 3 x 30   Horizontal Abduction/Ts GTB 2 x 12   Rhythmic Stabilization at 90/90 ER/IR 3 x 30   Shoulder Isometrics 10 x 10 Sec      - Flexion      - Extension      - External Rotation      - Internal Rotation     therapeutic activities to improve functional performance for 0 minutes, including:      hot pack for 0 minutes to .    cold pack for 0 minutes to .    Patient Education and Home Exercises       Education provided:   - HEP   - Plan of Care   - Explanation of Findings       Written Home Exercises Provided: Patient instructed to cont prior HEP. Exercises were reviewed and Ermelinda was able to demonstrate them prior to the end of the session.  Ermelinda demonstrated good  understanding of the education provided. See EMR under Patient Instructions for exercises provided during therapy sessions    Assessment   Ermelinda presents to physical therapy with some improvement in functional tolerance but continued pain and irritation and noted twitching in her finger that creates some  fear present.  We discussed nerve pathology and prognosis as well as typical signs and symptoms.  She continues to receive manual interventions to address deficits prominent through the cervical and thoracic spine mobility to offload the nervous system and subsequent irritation through the upper extremity neural tension.  She continued to work to reinforce mobility with deep neck flexor strengthening and endurance as well as periscapular strengthening which was progressed today to improve upward rotation to decrease subacromial impingement symptoms and provide better positioning to offload the cervical spine.  She tolerated progressions well and will continue to progress as able. Future assessment will look at potential for thoracic outlet syndrome.      rEmelinda Is progressing well towards her goals.   Pt prognosis is Good.     Pt will continue to benefit from skilled outpatient physical therapy to address the deficits listed in the problem list box on initial evaluation, provide pt/family education and to maximize pt's level of independence in the home and community environment.     Pt's spiritual, cultural and educational needs considered and pt agreeable to plan of care and goals.     Anticipated barriers to physical therapy: Hypermobility of the Shoulder     Goals:  Short Term Goals (4 Weeks):   1. Pt will be independent with HEP to supplement PT in improving functional use of R UE.  2. Pt will increase pain free shoulder elevation AROM to >/= 160 deg to improve functional mobility of UE  3. Pt will increase shoulder ER PROM in 0 deg abduction to >/=75 deg to improve functional mobility of UE  4. Pt will improve shoulder MMTs by 1/2 grade to promote equal use of B UEs in performing functional tasks.  Long Term Goals (8 Weeks):   1. Pt will improve FOTO score to </=TBD% limited to decrease perceived limitation with carrying, moving, and handling objects.  2. Pt will increase shoulder AROM to WFL in all planes to  improve functional use of R RUE.  3. Pt will improve shoulder MMTs by 1 grade to promote equal use of B UEs in performing functional tasks.  5. Pt will lift 10 lb objects without pain to promote functional QOL.  6. Pt to report pain </= 0/10 with ADLs and IADLs using UE to improve functional QOL.   Plan      Plan of care Certification: 12/9/2024 to 2/9/2024.     Outpatient Physical Therapy 2 times weekly for 8 weeks to include the following interventions: Cervical/Lumbar Traction, Electrical Stimulation , Gait Training, Manual Therapy, Moist Heat/ Ice, Neuromuscular Re-ed, Orthotic Management and Training, Patient Education, Self Care, Therapeutic Activities, and Therapeutic Exercise.        Raoul Brewer PT, DPT

## 2025-02-19 ENCOUNTER — PATIENT MESSAGE (OUTPATIENT)
Dept: INTERNAL MEDICINE | Facility: CLINIC | Age: 35
End: 2025-02-19
Payer: COMMERCIAL

## 2025-02-19 DIAGNOSIS — E55.9 VITAMIN D DEFICIENCY: ICD-10-CM

## 2025-02-20 RX ORDER — ERGOCALCIFEROL 1.25 MG/1
50000 CAPSULE ORAL
Qty: 12 CAPSULE | Refills: 0 | Status: SHIPPED | OUTPATIENT
Start: 2025-02-20 | End: 2025-05-09

## 2025-02-26 ENCOUNTER — CLINICAL SUPPORT (OUTPATIENT)
Dept: REHABILITATION | Facility: HOSPITAL | Age: 35
End: 2025-02-26
Payer: MEDICAID

## 2025-02-26 DIAGNOSIS — R29.898 WEAKNESS OF LEFT SHOULDER: ICD-10-CM

## 2025-02-26 DIAGNOSIS — M89.9 SCAPULAR DYSFUNCTION: ICD-10-CM

## 2025-02-26 DIAGNOSIS — M53.84 DECREASED RANGE OF MOTION OF THORACIC SPINE: ICD-10-CM

## 2025-02-26 DIAGNOSIS — M25.612 DECREASED RANGE OF MOTION OF LEFT SHOULDER: Primary | ICD-10-CM

## 2025-02-26 PROCEDURE — 97110 THERAPEUTIC EXERCISES: CPT

## 2025-02-26 NOTE — PROGRESS NOTES
[facility]   Physical Therapy Treatment Note/Progress Note/Updated Plan of Care:      Name: Ermelinda Trivedi  Clinic Number: [id number]    Therapy Diagnosis:   Encounter Diagnoses   Name Primary?    Decreased range of motion of left shoulder Yes    Weakness of left shoulder     Scapular dysfunction     Decreased range of motion of thoracic spine      Physician: Chadd Lynn MD    Visit Date: 2/26/2025  Physician Orders: PT Eval and Treat   Medical Diagnosis from Referral:   M25.512,G89.29 (ICD-10-CM) - Chronic left shoulder pain   Evaluation Date: 12/9/2024  Authorization Period Expiration: 12/31/24  Plan of Care Expiration: 4/9/2024  Progress Note Due: 3/19/2024   Visit # / Visits authorized: 6/16   FOTO: 1/3  PTA Visit #: 0/5   Precautions: Standard   Time In: 4:04 pm   Time Out: 5:11 pm   Total Billable Time: 67 minutes    Subjective   Pt reports: That yesterday was a bad day due to non-shoulder related things so she wanted to wait until today to come in so she could feel better with less pain.  Her shoulder still hurts with different things but she is feeling better and she has been able to sleep on her shoulder a little bit more     She was not compliant with home exercise program.  Response to previous treatment: Improvement in Pain Levels   Functional change: No Change     Pain: 5/10  Location: Left Shoulder      Objective      Objective Measures updated at progress report unless specified.       Right ROM Right MMT Left ROM Left MMT Notes:   Shoulder Flexion:  (180) 180 4/5 180 4-/5     Shoulder Abduction:  (180) 160 4/5 180 4-/5     Shoulder ER:*^  (90) 100 4/5 100 4-/5     Shoulder IR:  (70) 70 4-/5 70 4/5     Lower Trap: N/A 3/5 N/A 3/5     Middle Trap: N/A 3/5 N/A 3/5     Upper Trap:  N/A 4-/5 N/A 3+/5     Serratus Anterior: N/A 4-/5 N/A 3+/5     Rhomboids: N/A 4+/5 N/A 4+/5          Adverse Neural Testing:      - Positive (Not Relevant) Median Nerve Testing       - Positive  (Not Relevant) Radian Nerve Testing      - Negative Ulnar Nerve Testing     Joint Mobility:      - Hypomobility of C2 and C6     - Slightly Decreased Side Glides (Right)     Treatment     Crixtin received the treatments listed below:      therapeutic exercises to develop strength, endurance, ROM, flexibility, and core stabilization for 51 minutes including:    UBE 1 Min FWD and 1 Min BWD 8 Minutes   Patient Education   Assessment as Above   Chin Tuck with Biofeedback 1 x 10 10 Sec Holds   Chin Tuck with Biofeedback 1 x 15 3#   Side Lying External Rotation 2-3# 3 x 10 2 Sec Holds   Serratus Wall Slides 2 x 12   Upper Trap Level 3 2 x 12 3 Sec Holds   Horizontal Abduction/Ts GTB 2 x 12 2 Sec Holds     Not Today:   Rhythmic Stabilization 3 x 30   Rhythmic Stabilization at 90/90 ER/IR 3 x 30   Shoulder Isometrics 10 x 10 Sec      - Flexion      - Extension      - External Rotation      - Internal Rotation    Seated Thoracic Extension 1 x 15 5 Sec Holds     manual therapy techniques: The techniques below were applied for 16 minutes:     Cervical Side Glides Grade II-III (Right > Left)   OA Flexion Grade III  OA Side Bending III-IV  Sub Occipital Release   Soft Tissue Mobilization to Cervical Paraspinals   C1 Mobilization on Left with Chin Tuck     Not Today:   Upper Cervical Rotation MET (Right)   Supine Thoracic HVLAT   Posterior [place] Grade III-IV   Inferior [place] Grade II-III  Centering [place] with ER/IR MET     hot pack for 0 minutes to .    cold pack for 0 minutes to .    Patient Education and Home Exercises       Education provided:   - HEP   - Plan of Care   - Explanation of Findings       Written Home Exercises Provided: Patient instructed to cont prior HEP. Exercises were reviewed and Ermelinda was able to demonstrate them prior to the end of the session.  Ermelinda demonstrated good  understanding of the education provided. See EMR under Patient Instructions for exercises provided during therapy  sessions    Assessment   Ermelinda presents to physical therapy with reports of improving pain levels and functional motions but continued irritation through the neck and into the shoulder.  She is now able to tolerate some pressure onto her shoulder without as much irritation.  She is showing improvements in cervical joint mobility but continues to have upper cervical restrictions and limitations in C6-C7.  She continued to reinforce joint mobility with deep neck flexor training that will benefit intersegmental motion and decrease irritation.  She continued to work to improve specific rotator cuff strengthening and control to build humeral head control and stability while improving strength through the shoulder.  Periscapular training was continued to facilitate improved posture and offloading the cervical spine while improving upward rotation for clearance of the subacromial space.  Interventions were progressed to continue to address deficits noted.  Reassessment showed great improvement in shoulder range of motion and decreased pain and irritation with motion but pain does remain.  Future focus will be to continue to improve rotator cuff and periscapular strengthening and control for optimal functional training with focus on improving overhead motions.  She will continue to work to improve deep neck flexor stability training for control of the cervical spine.  She remains a good candidate for skilled interventions to address pain levels and deficits contributing to functional difficulties present.  She overall tolerated session well and will continue to progress as appropriate.      Ermelinda Is progressing well towards her goals.   Pt prognosis is Good.     Pt will continue to benefit from skilled outpatient physical therapy to address the deficits listed in the problem list box on initial evaluation, provide pt/family education and to maximize pt's level of independence in the home and community environment.      Pt's spiritual, cultural and educational needs considered and pt agreeable to plan of care and goals.     Anticipated barriers to physical therapy: Hypermobility of the Shoulder     Goals:  Short Term Goals (4 Weeks):   1. Pt will be independent with HEP to supplement PT in improving functional use of R UE.  2. Pt will increase pain free shoulder elevation AROM to >/= 160 deg to improve functional mobility of UE  3. Pt will increase shoulder ER PROM in 0 deg abduction to >/=75 deg to improve functional mobility of UE  4. Pt will improve shoulder MMTs by 1/2 grade to promote equal use of B UEs in performing functional tasks.  Long Term Goals (8 Weeks):   1. Pt will improve FOTO score to </=TBD% limited to decrease perceived limitation with carrying, moving, and handling objects.  2. Pt will increase shoulder AROM to WFL in all planes to improve functional use of R RUE.  3. Pt will improve shoulder MMTs by 1 grade to promote equal use of B UEs in performing functional tasks.  5. Pt will lift 10 lb objects without pain to promote functional QOL.  6. Pt to report pain </= 0/10 with ADLs and IADLs using UE to improve functional QOL.   Plan      Plan of care Certification: 12/9/2024 to 2/9/2024.     Outpatient Physical Therapy 2 times weekly for 8 weeks to include the following interventions: Cervical/Lumbar Traction, Electrical Stimulation , Gait Training, Manual Therapy, Moist Heat/ Ice, Neuromuscular Re-ed, Orthotic Management and Training, Patient Education, Self Care, Therapeutic Activities, and Therapeutic Exercise.        Raoul Brewer PT, DPT

## 2025-02-27 ENCOUNTER — TELEPHONE (OUTPATIENT)
Dept: INTERNAL MEDICINE | Facility: CLINIC | Age: 35
End: 2025-02-27
Payer: MEDICAID

## 2025-02-27 NOTE — TELEPHONE ENCOUNTER
----- Message from Maru sent at 2/27/2025  3:33 PM CST -----  Contact: [phone]  No blue slot available to schedule an appointment for the patient.Patient is established with which PCP: Chaunceyon for the visit: left ear ck, sound from ear has a whooshing soundWould the patient like a call back, or a response through their [facility] portal?:  call

## 2025-02-28 ENCOUNTER — TELEPHONE (OUTPATIENT)
Dept: INTERNAL MEDICINE | Facility: CLINIC | Age: 35
End: 2025-02-28
Payer: MEDICAID

## 2025-02-28 ENCOUNTER — HOSPITAL ENCOUNTER (EMERGENCY)
Facility: HOSPITAL | Age: 35
Discharge: HOME | End: 2025-03-01
Attending: EMERGENCY MEDICINE
Payer: MEDICAID

## 2025-02-28 DIAGNOSIS — R00.0 TACHYCARDIA: ICD-10-CM

## 2025-02-28 DIAGNOSIS — R10.13 EPIGASTRIC PAIN: Primary | ICD-10-CM

## 2025-02-28 DIAGNOSIS — R19.7 DIARRHEA, UNSPECIFIED TYPE: ICD-10-CM

## 2025-02-28 LAB
ALBUMIN SERPL BCP-MCNC: 4.4 G/DL (ref 3.5–5.2)
ALP SERPL-CCNC: 76 U/L (ref 40–150)
ALT SERPL W/O P-5'-P-CCNC: 27 U/L (ref 10–44)
ANION GAP SERPL CALC-SCNC: 13 MMOL/L (ref 8–16)
AST SERPL-CCNC: 23 U/L (ref 10–40)
B-HCG UR QL: NEGATIVE
BACTERIA #/AREA URNS AUTO: NORMAL /HPF
BASOPHILS # BLD AUTO: 0.02 K/UL (ref 0–0.2)
BASOPHILS NFR BLD: 0.2 % (ref 0–1.9)
BILIRUB SERPL-MCNC: 0.5 MG/DL (ref 0.1–1)
BILIRUB UR QL STRIP: NEGATIVE
BUN SERPL-MCNC: 6 MG/DL (ref 6–20)
BUN SERPL-MCNC: 6 MG/DL (ref 6–30)
CALCIUM SERPL-MCNC: 9.4 MG/DL (ref 8.7–10.5)
CHLORIDE SERPL-SCNC: 103 MMOL/L (ref 95–110)
CHLORIDE SERPL-SCNC: 104 MMOL/L (ref 95–110)
CLARITY UR REFRACT.AUTO: CLEAR
CO2 SERPL-SCNC: 20 MMOL/L (ref 23–29)
COLOR UR AUTO: COLORLESS
CREAT SERPL-MCNC: 0.5 MG/DL (ref 0.5–1.4)
CREAT SERPL-MCNC: 0.7 MG/DL (ref 0.5–1.4)
CTP QC/QA: YES
DIFFERENTIAL METHOD BLD: ABNORMAL
EOSINOPHIL # BLD AUTO: 0 K/UL (ref 0–0.5)
EOSINOPHIL NFR BLD: 0.1 % (ref 0–8)
ERYTHROCYTE [DISTWIDTH] IN BLOOD BY AUTOMATED COUNT: 12.7 % (ref 11.5–14.5)
EST. GFR  (NO RACE VARIABLE): >60 ML/MIN/1.73 M^2
GLUCOSE SERPL-MCNC: 108 MG/DL (ref 70–110)
GLUCOSE SERPL-MCNC: 112 MG/DL (ref 70–110)
GLUCOSE UR QL STRIP: NEGATIVE
HCT VFR BLD AUTO: 41.3 % (ref 37–48.5)
HCT VFR BLD CALC: 41 %PCV (ref 36–54)
HCV AB SERPL QL IA: NORMAL
HGB BLD-MCNC: 12.9 G/DL (ref 12–16)
HGB UR QL STRIP: ABNORMAL
HIV 1+2 AB+HIV1 P24 AG SERPL QL IA: NORMAL
IMM GRANULOCYTES # BLD AUTO: 0.06 K/UL (ref 0–0.04)
IMM GRANULOCYTES NFR BLD AUTO: 0.5 % (ref 0–0.5)
INFLUENZA A, MOLECULAR: NEGATIVE
INFLUENZA B, MOLECULAR: NEGATIVE
KETONES UR QL STRIP: ABNORMAL
LEUKOCYTE ESTERASE UR QL STRIP: NEGATIVE
LIPASE SERPL-CCNC: 13 U/L (ref 4–60)
LYMPHOCYTES # BLD AUTO: 0.5 K/UL (ref 1–4.8)
LYMPHOCYTES NFR BLD: 4.3 % (ref 18–48)
MCH RBC QN AUTO: 27.3 PG (ref 27–31)
MCHC RBC AUTO-ENTMCNC: 31.2 G/DL (ref 32–36)
MCV RBC AUTO: 87 FL (ref 82–98)
MICROSCOPIC COMMENT: NORMAL
MONOCYTES # BLD AUTO: 0.4 K/UL (ref 0.3–1)
MONOCYTES NFR BLD: 3.2 % (ref 4–15)
NEUTROPHILS # BLD AUTO: 11.6 K/UL (ref 1.8–7.7)
NEUTROPHILS NFR BLD: 91.7 % (ref 38–73)
NITRITE UR QL STRIP: NEGATIVE
NRBC BLD-RTO: 0 /100 WBC
PH UR STRIP: 5 [PH] (ref 5–8)
PLATELET # BLD AUTO: 276 K/UL (ref 150–450)
PMV BLD AUTO: 11.1 FL (ref 9.2–12.9)
POC IONIZED CALCIUM: 1.07 MMOL/L (ref 1.06–1.42)
POC TCO2 (MEASURED): 26 MMOL/L (ref 23–29)
POTASSIUM BLD-SCNC: 3.8 MMOL/L (ref 3.5–5.1)
POTASSIUM SERPL-SCNC: 3.8 MMOL/L (ref 3.5–5.1)
PROT SERPL-MCNC: 8.2 G/DL (ref 6–8.4)
PROT UR QL STRIP: NEGATIVE
RBC # BLD AUTO: 4.73 M/UL (ref 4–5.4)
RBC #/AREA URNS AUTO: 3 /HPF (ref 0–4)
SAMPLE: ABNORMAL
SARS-COV-2 RDRP RESP QL NAA+PROBE: NEGATIVE
SODIUM BLD-SCNC: 137 MMOL/L (ref 136–145)
SODIUM SERPL-SCNC: 137 MMOL/L (ref 136–145)
SP GR UR STRIP: 1.01 (ref 1–1.03)
SPECIMEN SOURCE: NORMAL
SQUAMOUS #/AREA URNS AUTO: 2 /HPF
UNSPECIFIED CRY UR QL COMP ASSIST: <1
URN SPEC COLLECT METH UR: ABNORMAL
WBC # BLD AUTO: 12.59 K/UL (ref 3.9–12.7)
WBC #/AREA URNS AUTO: 2 /HPF (ref 0–5)

## 2025-02-28 PROCEDURE — 87502 INFLUENZA DNA AMP PROBE: CPT | Performed by: EMERGENCY MEDICINE

## 2025-02-28 PROCEDURE — 96375 TX/PRO/DX INJ NEW DRUG ADDON: CPT

## 2025-02-28 PROCEDURE — 25000003 PHARM REV CODE 250: Performed by: EMERGENCY MEDICINE

## 2025-02-28 PROCEDURE — 80047 BASIC METABLC PNL IONIZED CA: CPT

## 2025-02-28 PROCEDURE — 96374 THER/PROPH/DIAG INJ IV PUSH: CPT

## 2025-02-28 PROCEDURE — 25500020 PHARM REV CODE 255: Performed by: EMERGENCY MEDICINE

## 2025-02-28 PROCEDURE — 81025 URINE PREGNANCY TEST: CPT | Performed by: EMERGENCY MEDICINE

## 2025-02-28 PROCEDURE — 86803 HEPATITIS C AB TEST: CPT | Performed by: PHYSICIAN ASSISTANT

## 2025-02-28 PROCEDURE — 87389 HIV-1 AG W/HIV-1&-2 AB AG IA: CPT | Performed by: PHYSICIAN ASSISTANT

## 2025-02-28 PROCEDURE — 93010 ELECTROCARDIOGRAM REPORT: CPT | Mod: ,,, | Performed by: INTERNAL MEDICINE

## 2025-02-28 PROCEDURE — 82330 ASSAY OF CALCIUM: CPT

## 2025-02-28 PROCEDURE — 99285 EMERGENCY DEPT VISIT HI MDM: CPT | Mod: 25

## 2025-02-28 PROCEDURE — 83690 ASSAY OF LIPASE: CPT | Performed by: EMERGENCY MEDICINE

## 2025-02-28 PROCEDURE — 96361 HYDRATE IV INFUSION ADD-ON: CPT

## 2025-02-28 PROCEDURE — 93005 ELECTROCARDIOGRAM TRACING: CPT

## 2025-02-28 PROCEDURE — 81001 URINALYSIS AUTO W/SCOPE: CPT | Performed by: EMERGENCY MEDICINE

## 2025-02-28 PROCEDURE — 85025 COMPLETE CBC W/AUTO DIFF WBC: CPT | Performed by: EMERGENCY MEDICINE

## 2025-02-28 PROCEDURE — 63600175 PHARM REV CODE 636 W HCPCS: Performed by: EMERGENCY MEDICINE

## 2025-02-28 PROCEDURE — 80053 COMPREHEN METABOLIC PANEL: CPT | Performed by: EMERGENCY MEDICINE

## 2025-02-28 PROCEDURE — 87635 SARS-COV-2 COVID-19 AMP PRB: CPT | Performed by: EMERGENCY MEDICINE

## 2025-02-28 RX ORDER — ONDANSETRON HYDROCHLORIDE 2 MG/ML
4 INJECTION, SOLUTION INTRAVENOUS
Status: COMPLETED | OUTPATIENT
Start: 2025-02-28 | End: 2025-02-28

## 2025-02-28 RX ORDER — KETOROLAC TROMETHAMINE 30 MG/ML
10 INJECTION, SOLUTION INTRAMUSCULAR; INTRAVENOUS
Status: COMPLETED | OUTPATIENT
Start: 2025-02-28 | End: 2025-02-28

## 2025-02-28 RX ADMIN — KETOROLAC TROMETHAMINE 10 MG: 30 INJECTION, SOLUTION INTRAMUSCULAR; INTRAVENOUS at 08:02

## 2025-02-28 RX ADMIN — SODIUM CHLORIDE 1000 ML: 9 INJECTION, SOLUTION INTRAVENOUS at 07:02

## 2025-02-28 RX ADMIN — ONDANSETRON 4 MG: 2 INJECTION INTRAMUSCULAR; INTRAVENOUS at 08:02

## 2025-02-28 RX ADMIN — IOHEXOL 75 ML: 350 INJECTION, SOLUTION INTRAVENOUS at 10:02

## 2025-02-28 NOTE — TELEPHONE ENCOUNTER
----- Message from Paula sent at 2/27/2025  4:57 PM CST -----  Type:  Patient Returning CallWho Called:ptWho Left Message for Patient:ptDoes the patient know what this is regarding?:pt had a missed call and was calling back Would the patient rather a call back or a response via [facility]?  Call Best Call Back Number: [phone] Information:  call back

## 2025-02-28 NOTE — TELEPHONE ENCOUNTER
----- Message from Cathy sent at 2/28/2025 12:04 PM CST -----  Contact: patient @ portal message  Patient is returning a phone call.Who left a message for the patient: Skylar Daniel MADoes patient know what this is regarding:  AppointmentWould you like a call back, or a response through your [facility] portal?:   call Comments: Please schedule for 3 to 4 pm Monday to Thursday . Please no Friday Patient need it ASAP . Patient is having ringing in the ear Symptom: Ear Ringing or Buzzing (No Pain)Outcome: Schedule an appointment to be seen within 3 days.Reason: Caller denied all higher acuity questionsThe caller accepted this outcome.

## 2025-03-01 VITALS
HEART RATE: 103 BPM | DIASTOLIC BLOOD PRESSURE: 60 MMHG | RESPIRATION RATE: 16 BRPM | HEIGHT: 60 IN | SYSTOLIC BLOOD PRESSURE: 118 MMHG | BODY MASS INDEX: 31.02 KG/M2 | TEMPERATURE: 98 F | OXYGEN SATURATION: 96 % | WEIGHT: 158 LBS

## 2025-03-01 LAB
OHS QRS DURATION: 72 MS
OHS QTC CALCULATION: 549 MS

## 2025-03-01 PROCEDURE — 96361 HYDRATE IV INFUSION ADD-ON: CPT

## 2025-03-01 PROCEDURE — 63600175 PHARM REV CODE 636 W HCPCS: Performed by: STUDENT IN AN ORGANIZED HEALTH CARE EDUCATION/TRAINING PROGRAM

## 2025-03-01 RX ORDER — ONDANSETRON 4 MG/1
4 TABLET, FILM COATED ORAL EVERY 6 HOURS
Qty: 12 TABLET | Refills: 0 | Status: SHIPPED | OUTPATIENT
Start: 2025-03-01

## 2025-03-01 RX ORDER — LIDOCAINE HYDROCHLORIDE 20 MG/ML
15 SOLUTION OROPHARYNGEAL ONCE
Status: DISCONTINUED | OUTPATIENT
Start: 2025-03-01 | End: 2025-03-01 | Stop reason: HOSPADM

## 2025-03-01 RX ORDER — ALUMINUM HYDROXIDE, MAGNESIUM HYDROXIDE, AND SIMETHICONE 1200; 120; 1200 MG/30ML; MG/30ML; MG/30ML
30 SUSPENSION ORAL ONCE
Status: DISCONTINUED | OUTPATIENT
Start: 2025-03-01 | End: 2025-03-01 | Stop reason: HOSPADM

## 2025-03-01 RX ORDER — DICYCLOMINE HYDROCHLORIDE 20 MG/1
20 TABLET ORAL 2 TIMES DAILY
Qty: 20 TABLET | Refills: 0 | Status: SHIPPED | OUTPATIENT
Start: 2025-03-01 | End: 2025-03-11

## 2025-03-01 RX ADMIN — SODIUM CHLORIDE, POTASSIUM CHLORIDE, SODIUM LACTATE AND CALCIUM CHLORIDE 1000 ML: 600; 310; 30; 20 INJECTION, SOLUTION INTRAVENOUS at 12:03

## 2025-03-01 RX ADMIN — SODIUM CHLORIDE, POTASSIUM CHLORIDE, SODIUM LACTATE AND CALCIUM CHLORIDE 1000 ML: 600; 310; 30; 20 INJECTION, SOLUTION INTRAVENOUS at 03:03

## 2025-03-01 NOTE — FIRST PROVIDER EVALUATION
Medical screening examination initiated.  I have conducted a focused provider triage encounter, findings are as follows:    Brief history of present illness:  Briefly, patient is a 34-year-old female without significant past medical history who presents to the emergency department with 2 days of upper abdominal pain, diarrhea chills, palpitations.  The patient's smart watch alerted her that she was tachycardic prior to arrival.    Vitals:    02/28/25 1813   BP: 130/70   BP Location: Right arm   Pulse: (!) 149   Resp: 20   Temp: 98.3 °F (36.8 °C)   TempSrc: Oral   SpO2: 97%   Weight: 71.7 kg (158 lb)   Height: 5' (1.524 m)         Brief workup plan:    Sinus tachycardia @ 140 bpm, no ischemic ST/tW changes    Labs, IVF pending ED bed    Preliminary workup initiated; this workup will be continued and followed by the physician or advanced practice provider that is assigned to the patient when roomed.

## 2025-03-01 NOTE — PROVIDER PROGRESS NOTES - EMERGENCY DEPT.
Encounter Date: 2/28/2025    ED Physician Progress Notes        Physician Note:   Patient care assumed from Dr. Hodges at shift change. Briefly, patient presents with acute abdominal pain and diarrhea. At time of handoff, we are pending CT imaging, PO challenge and improvement in vital signs.    I evaluated patient at *** after handoff.

## 2025-03-01 NOTE — DISCHARGE INSTRUCTIONS
You were seen in the Emergency Department today for abdominal pain, nausea, vomiting, diarrhea. We did labs, and a CT scan which was normal. Please continue to hydrate well at home.    You may take the bentyl up to two times per day for abdominal cramping.    Please follow up with your primary doctor in the next 3-5 days for a repeat evaluation and further management.    Please return to the Emergency Department immediately for any continued or worsening symptoms such as fever, chills, worsening pain, or feeling dizzy, lightheaded, or persistent nausea, vomiting.

## 2025-03-01 NOTE — ED TRIAGE NOTES
Ermelinda Trivedi, a 34 y.o. female presents to the ED w/ complaint of abdominal pain and nausea since last night around 2300. Pt states that she could not sleep, and that the pain got worse since. Pt endorses 7 instances of watery diarrhea today. Denies fevers at home but endorses chills.    Triage note:  Chief Complaint   Patient presents with    Abdominal Pain     Upper abd pain, diarrhea, chills started last night. +palpitations started this afternoon. Denies N/V      Review of patient's allergies indicates:  No Known Allergies  No past medical history on file.

## 2025-03-01 NOTE — ED PROVIDER NOTES
Encounter Date: 2/28/2025       History     Chief Complaint   Patient presents with    Abdominal Pain     Upper abd pain, diarrhea, chills started last night. +palpitations started this afternoon. Denies N/V      34-year-old female presenting with epigastric abdominal pain.    PMH:  Anxiety     The patient states she ate [employer] yesterday and then [place] food.  After eating the [place] food, she developed multiple episodes of non-bloody diarrhea.  She endorses nausea, no vomiting.  She endorses pain in her epigastrium.  She also reports chills.  Denies dysuria or hematuria.  The pain hasn't moved, per se, but now seems to be her upper abdomen above her bell button.   She states she gets a 'stomach bug' about once per year.  Denies prior abdominal surgery.   She tried taking pepto and imodium for her sx.    The history is provided by the patient and medical records. No  was used.     Review of patient's allergies indicates:  No Known Allergies  History reviewed. No pertinent past medical history.  History reviewed. No pertinent surgical history.  Family History   Problem Relation Name Age of Onset    Hypertension Mother      Hypertension Father       Social History[1]    Physical Exam     Initial Vitals [02/28/25 1813]   BP Pulse Resp Temp SpO2   130/70 (!) 149 20 98.3 °F (36.8 °C) 97 %      MAP       --         Physical Exam    Nursing note and vitals reviewed.  Constitutional: She is not diaphoretic. No distress.   HENT:   Head: Normocephalic and atraumatic.   Eyes: Right eye exhibits no discharge. Left eye exhibits no discharge.   Neck: Neck supple. No tracheal deviation present.   Cardiovascular:  Regular rhythm.   Tachycardia present.         Pulmonary/Chest: Breath sounds normal. No respiratory distress.   Abdominal: Abdomen is soft. There is abdominal tenderness.   Epigastric TTP  Not peritonitic  There is no tenderness at McBurney's point and negative Haji's sign.   Musculoskeletal:       Cervical back: Neck supple.     Neurological: She is alert and oriented to person, place, and time.   Skin: Skin is warm. No rash noted.   Psychiatric: She has a normal mood and affect. Her behavior is normal.         ED Course   Procedures  Labs Reviewed   CBC W/ AUTO DIFFERENTIAL - Abnormal       Result Value    WBC 12.59      RBC 4.73      Hemoglobin 12.9      Hematocrit 41.3      MCV 87      MCH 27.3      MCHC 31.2 (*)     RDW 12.7      Platelets 276      MPV 11.1      Immature Granulocytes 0.5      Gran # (ANC) 11.6 (*)     Immature Grans (Abs) 0.06 (*)     Lymph # 0.5 (*)     Mono # 0.4      Eos # 0.0      Baso # 0.02      nRBC 0      Gran % 91.7 (*)     Lymph % 4.3 (*)     Mono % 3.2 (*)     Eosinophil % 0.1      Basophil % 0.2      Differential Method Automated      Narrative:     Release to patient->Immediate   COMPREHENSIVE METABOLIC PANEL - Abnormal    Sodium 137      Potassium 3.8      Chloride 104      CO2 20 (*)     Glucose 108      BUN 6      Creatinine 0.7      Calcium 9.4      Total Protein 8.2      Albumin 4.4      Total Bilirubin 0.5      Alkaline Phosphatase 76      AST 23      ALT 27      eGFR >60.0      Anion Gap 13      Narrative:     Release to patient->Immediate   URINALYSIS, REFLEX TO URINE CULTURE - Abnormal    Specimen UA Urine, Clean Catch      Color, UA Colorless (*)     Appearance, UA Clear      pH, UA 5.0      Specific Gravity, UA 1.010      Protein, UA Negative      Glucose, UA Negative      Ketones, UA 1+ (*)     Bilirubin (UA) Negative      Occult Blood UA 2+ (*)     Nitrite, UA Negative      Leukocytes, UA Negative      Narrative:     Specimen Source->Urine   ISTAT PROCEDURE - Abnormal    POC Glucose 112 (*)     POC BUN 6      POC Creatinine 0.5      POC Sodium 137      POC Potassium 3.8      POC Chloride 103      POC TCO2 (MEASURED) 26      POC Ionized Calcium 1.07      POC Hematocrit 41      Sample BEA     INFLUENZA A & B BY MOLECULAR    Influenza A, Molecular Negative       Influenza B, Molecular Negative      Flu A & B Source Nasal swab     LIPASE    Lipase 13      Narrative:     Release to patient->Immediate   SARS-COV-2 RNA AMPLIFICATION, QUAL    SARS-CoV-2 RNA, Amplification, Qual Negative     HEPATITIS C ANTIBODY    Hepatitis C Ab Non-reactive      Narrative:     Release to patient->Immediate   HIV 1 / 2 ANTIBODY    HIV 1/2 Ag/Ab Non-reactive      Narrative:     Release to patient->Immediate   URINALYSIS MICROSCOPIC    RBC, UA 3      WBC, UA 2      Bacteria Rare      Squam Epithel, UA 2      Unclass Rebeca UA <1      Microscopic Comment SEE COMMENT      Narrative:     Specimen Source->Urine   POCT URINE PREGNANCY    POC Preg Test, Ur Negative       Acceptable Yes       EKG Readings: (Independently Interpreted)   Initial Reading: No STEMI. Previous EKG: Compared with most recent EKG Previous EKG Date: 5/29/2022. Rhythm: Sinus Tachycardia. Heart Rate: 140. Ectopy: No Ectopy. Clinical Impression: Sinus Tachycardia     ECG Results              EKG 12-lead (Final result)        Collection Time Result Time QRS Duration OHS QTC Calculation    02/28/25 18:20:20 03/01/25 08:40:15 72 549                     Final result by Interface, Lab In [facility] (03/01/25 08:40:24)                   Narrative:    Test Reason : R00.0,    Vent. Rate : 140 BPM     Atrial Rate : 140 BPM     P-R Int : 112 ms          QRS Dur :  72 ms      QT Int : 360 ms       P-R-T Axes :  33  17  64 degrees    QTcB Int : 549 ms    Sinus tachycardia Nonspecific T wave abnormality  Abnormal R wave progression  Cannot rule out Anterior infarct ,age undetermined  Abnormal ECG  When compared with ECG of 19-May-2022 19:58,  Nonspecific T wave abnormality no longer evident in Anterior leads  Repeat ECG with appropriate lead placement if clinically indicated  Confirmed by Dre Long ([id number]) on 3/1/2025 8:40:14 AM    Referred By: AAAREFERRAL SELF           Confirmed By: Dre Long                                   Imaging Results              CT Abdomen Pelvis With IV Contrast NO Oral Contrast (Final result)  Result time 02/28/25 23:47:22      Final result by Guru Silver MD (02/28/25 23:47:22)                   Impression:      Findings suggestive of a nonspecific enteritis and/or diarrheal illness in the appropriate clinical setting.    Normal appendix.    Incidental findings discussed in the body of the report.      Electronically signed by: Guru Silver MD  Date:    02/28/2025  Time:    23:47               Narrative:    EXAMINATION:  CT ABDOMEN PELVIS WITH IV CONTRAST    CLINICAL HISTORY:  RLQ abdominal pain (Age >= 14y);    TECHNIQUE:  Low dose axial images, sagittal and coronal reformations were obtained from the lung bases to the pubic symphysis following the IV administration of 75 mL of Omnipaque 350 .  Oral contrast was not given.    COMPARISON:  Abdominal ultrasound, 02/28/2025.    FINDINGS:  Lower Chest:    Lung bases are clear.  Heart size is normal.    Abdomen:    Mild diffuse hypoattenuation of the liver parenchyma which may be related to steatosis or contrast phase.  Liver is otherwise unremarkable.  No worrisome liver mass identified on this single phase study.  Gallbladder is unremarkable. No intrahepatic biliary ductal dilatation.    Spleen, adrenals, and pancreas are unremarkable.    The kidneys are symmetric.  No hydronephrosis. No asymmetric perinephric fat stranding.    No small bowel obstruction.  Minimal small bowel wall enhancement.  Liquid stool in the colon.  Normal appendix.  Few prominent mesenteric lymph nodes which are not enlarged by size criteria.    No pneumoperitoneum or organized fluid collection.    No bulky retroperitoneal lymphadenopathy.    Abdominal aorta is normal in caliber without significant atherosclerosis.    Portal, splenic, and superior mesenteric veins are patent. No portal venous gas.  Circumaortic left renal vein.    Pelvis:    Urinary bladder, pelvic organs,  and rectum are unremarkable.  Minimal pelvic free fluid, likely physiologic.    Bones and soft tissues:    No aggressive osseous lesions.  Extraperitoneal soft tissues are negative for acute finding.                                       US Abdomen Limited (Final result)  Result time 02/28/25 21:44:27      Final result by Guru Silver MD (02/28/25 21:44:27)                   Impression:      No acute sonographic abnormality in the upper abdomen.  No cholelithiasis or sonographic findings of acute cholecystitis.      Electronically signed by: Guru Silver MD  Date:    02/28/2025  Time:    21:44               Narrative:    EXAMINATION:  US ABDOMEN LIMITED    CLINICAL HISTORY:  RUQ, assess for cholelithiasis, cholecystitis;.    TECHNIQUE:  Limited ultrasound of the right upper quadrant of the abdomen including pancreas, liver, gallbladder, common bile duct was performed.    COMPARISON:  None.    FINDINGS:  Liver: Homogeneous echotexture. No focal hepatic lesions.    Gallbladder: No calculi, wall thickening, or pericholecystic fluid.  No sonographic Haji's sign.    Biliary system: The common duct is not dilated, measuring 3 mm.  No intrahepatic ductal dilatation.    Pancreas: The visualized portions of pancreas appear normal.    Miscellaneous: No ascites. Limited evaluation of the right kidney is negative for hydronephrosis.                                       Medications   sodium chloride 0.9% bolus 1,000 mL 1,000 mL (0 mLs Intravenous Stopped 2/28/25 2128)   ketorolac injection 9.999 mg (9.999 mg Intravenous Given 2/28/25 2024)   ondansetron injection 4 mg (4 mg Intravenous Given 2/28/25 2029)   iohexoL (OMNIPAQUE 350) injection 75 mL (75 mLs Intravenous Given 2/28/25 2222)   lactated ringers bolus 1,000 mL (0 mLs Intravenous Stopped 3/1/25 0234)   lactated ringers bolus 1,000 mL (0 mLs Intravenous Stopped 3/1/25 0708)     Medical Decision Making  Emergent evaluation of a 34-year-old female presenting  with epigastric abdominal pain, nausea, diarrhea. IVF for hydration. Toradol and zofran for sx control.     Differential including, but not limited to:  Pancreatitis, gastritis, gastroenteritis, early appendicitis, cholecystitis, cholelithiasis, ectopic pregnancy, viral illness   Doubt torsion given history and exam (no pelvic pain, not waxing/waning)    U preg negative.   No leukocytosis, no UTI. Abnormal diff (should follow with PCP to resolution).  RUQ US negative for cholelithiasis, cholecystitis. Lipase WNL.    Signed out to oncoming attending pending CT A/P result, reassessment and PO challenge.   If no emergent findings and able to tolerate PO, would favor food-borne illness and anticipate d/c.  Tachycardia improving with sx control and hydration.     Amount and/or Complexity of Data Reviewed  Labs: ordered. Decision-making details documented in ED Course.  Radiology: ordered.  ECG/medicine tests: ordered and independent interpretation performed.    Risk  Prescription drug management.               ED Course as of 03/01/25 1019   Fri Feb 28, 2025 1953 Pulse(!): 149 [AB]   2141 hCG Qualitative, Urine: Negative [AB]   2141 Lipase: 13  Normal [AB]   2220 Leukocyte Esterase, UA: Negative [AB]   2220 NITRITE UA: Negative  No UTI [AB]      ED Course User Index  [AB] Jeff Hodges MD                           Clinical Impression:  Final diagnoses:  [R00.0] Tachycardia          ED Disposition Condition    Discharge Stable          ED Prescriptions       Medication Sig Dispense Start Date End Date Auth. Provider    dicyclomine (BENTYL) 20 mg tablet Take 1 tablet (20 mg total) by mouth 2 (two) times daily. for 10 days 20 tablet 3/1/2025 3/11/2025 Sameer Umanzor MD    ondansetron (ZOFRAN) 4 MG tablet Take 1 tablet (4 mg total) by mouth every 6 (six) hours. 12 tablet 3/1/2025 -- Sameer Umanzor MD          Follow-up Information       Follow up With Specialties Details Why Contact Info    Shane  MD [facility] In 3 days For a follow up appointment, As soon as possible [address] [facility] [address]  [phone]                 [1]   Social History  Tobacco Use    Smoking status: Never    Smokeless tobacco: Never   Substance Use Topics    Alcohol use: Never    Drug use: Never        Jeff Hodges MD  03/01/25 1024

## 2025-03-06 ENCOUNTER — CLINICAL SUPPORT (OUTPATIENT)
Dept: REHABILITATION | Facility: HOSPITAL | Age: 35
End: 2025-03-06
Payer: COMMERCIAL

## 2025-03-06 DIAGNOSIS — R29.898 WEAKNESS OF LEFT SHOULDER: ICD-10-CM

## 2025-03-06 DIAGNOSIS — M89.9 SCAPULAR DYSFUNCTION: ICD-10-CM

## 2025-03-06 DIAGNOSIS — M25.612 DECREASED RANGE OF MOTION OF LEFT SHOULDER: Primary | ICD-10-CM

## 2025-03-06 DIAGNOSIS — M53.84 DECREASED RANGE OF MOTION OF THORACIC SPINE: ICD-10-CM

## 2025-03-06 PROCEDURE — 97110 THERAPEUTIC EXERCISES: CPT

## 2025-03-06 NOTE — PROGRESS NOTES
[facility]   Physical Therapy Treatment Note:      Name: Ermelinda Trivedi  Clinic Number: [id number]    Therapy Diagnosis:   Encounter Diagnoses   Name Primary?    Decreased range of motion of left shoulder Yes    Weakness of left shoulder     Scapular dysfunction     Decreased range of motion of thoracic spine      Physician: Chadd Lynn MD    Visit Date: 3/6/2025  Physician Orders: PT Eval and Treat   Medical Diagnosis from Referral:   M25.512,G89.29 (ICD-10-CM) - Chronic left shoulder pain   Evaluation Date: 12/9/2024  Authorization Period Expiration: 12/31/24  Plan of Care Expiration: 4/9/2024  Progress Note Due: 3/19/2024   Visit # / Visits authorized: 7/16   FOTO: 1/3  PTA Visit #: 0/5   Precautions: Standard   Time In: 3:04 pm   Time Out: 4:02 pm   Total Billable Time: 58 minutes    Subjective   Pt reports: That went to the emergency room on Saturday because she had a bad stomach bug and had to get 3 bags of fluid.  She really hasn't thought about her shoulder at all because her stomach was so troublesome.  She is still having pain in the top of the shoulder with maximal external rotation and slight pain with arm elevation.  She has been able to sleep on her shoulder though.      She was not compliant with home exercise program.  Response to previous treatment: Improvement in Pain Levels   Functional change: No Change     Pain: 5/10  Location: Left Shoulder      Objective      Objective Measures updated at progress report unless specified.     Treatment     Ermelinda received the treatments listed below:      therapeutic exercises to develop strength, endurance, ROM, flexibility, and core stabilization for 47 minutes including:    Supine Serratus Punch 5# 3 x 8 3 Sec Holds   Chin Tuck with Biofeedback 2 x 10 10 Sec Holds   Chin Tuck with Biofeedback 1 x 15 3#   Side Lying External Rotation 3# 3 x 10 2 Sec Holds   Serratus Hand Heel Rock Backs 3 x 8   Supine Horizontal Abduction/Ts  GTB 2 x 12 2 Sec Holds   Shoulder Elevation/Shoulder Shrug with Finger Wiggles 2 x 10     Not Today:   UBE 1 Min FWD and 1 Min BWD 8 Minutes   Serratus Wall Slides 2 x 12   Upper Trap Level 3 2 x 12 3 Sec Holds   Rhythmic Stabilization 3 x 30   Rhythmic Stabilization at 90/90 ER/IR 3 x 30   Shoulder Isometrics 10 x 10 Sec      - Flexion      - Extension      - External Rotation      - Internal Rotation    Seated Thoracic Extension 1 x 15 5 Sec Holds     manual therapy techniques: The techniques below were applied for 11 minutes:     Cervical Side Glides Grade II-III (Right > Left)   OA Flexion Grade III  OA Side Bending III-IV  Sub Occipital Release   Soft Tissue Mobilization to Cervical Paraspinals   Upper Cervical Rotation MET (Right)     Not Today:   C1 Mobilization on Left with Chin Tuck   Supine Thoracic HVLAT   Posterior [place] Grade III-IV   Inferior [place] Grade II-III  Centering [place] with ER/IR MET     hot pack for 0 minutes to .    cold pack for 0 minutes to .    Patient Education and Home Exercises       Education provided:   - HEP   - Plan of Care   - Explanation of Findings     Written Home Exercises Provided: Patient instructed to cont prior HEP. Exercises were reviewed and Ermelinda was able to demonstrate them prior to the end of the session.  Ermelinda demonstrated good  understanding of the education provided. See EMR under Patient Instructions for exercises provided during therapy sessions    Assessment   Ermelinda presents to physical therapy with increased levels of fatigue and malaise due to recovery from serious stomach illness.  She is able to demonstrate nearly full elevation range of motion with slight superior shoulder pain that is slightly alleviated with scapular assistance.  External rotation is painful and creates a slight radiating pain into the arm.  She continues to work to address rotator cuff strengthening and stability as well as deep neck flexor strengthening and coordination to  reinforce stability and control of the neural components.   She will continue to need to address periscapular strengthening to improve upward rotation and clearance of the humeral head with elevation.  She will continue to benefit from skilled intervention to address deficits and pain levels.      Crixtin Is progressing well towards her goals.   Pt prognosis is Good.     Pt will continue to benefit from skilled outpatient physical therapy to address the deficits listed in the problem list box on initial evaluation, provide pt/family education and to maximize pt's level of independence in the home and community environment.     Pt's spiritual, cultural and educational needs considered and pt agreeable to plan of care and goals.     Anticipated barriers to physical therapy: Hypermobility of the Shoulder     Goals:  Short Term Goals (4 Weeks):   1. Pt will be independent with HEP to supplement PT in improving functional use of R UE.  2. Pt will increase pain free shoulder elevation AROM to >/= 160 deg to improve functional mobility of UE  3. Pt will increase shoulder ER PROM in 0 deg abduction to >/=75 deg to improve functional mobility of UE  4. Pt will improve shoulder MMTs by 1/2 grade to promote equal use of B UEs in performing functional tasks.  Long Term Goals (8 Weeks):   1. Pt will improve FOTO score to </=TBD% limited to decrease perceived limitation with carrying, moving, and handling objects.  2. Pt will increase shoulder AROM to WFL in all planes to improve functional use of R RUE.  3. Pt will improve shoulder MMTs by 1 grade to promote equal use of B UEs in performing functional tasks.  5. Pt will lift 10 lb objects without pain to promote functional QOL.  6. Pt to report pain </= 0/10 with ADLs and IADLs using UE to improve functional QOL.   Plan      Plan of care Certification: 12/9/2024 to 2/9/2024.     Outpatient Physical Therapy 2 times weekly for 8 weeks to include the following interventions:  Cervical/Lumbar Traction, Electrical Stimulation , Gait Training, Manual Therapy, Moist Heat/ Ice, Neuromuscular Re-ed, Orthotic Management and Training, Patient Education, Self Care, Therapeutic Activities, and Therapeutic Exercise.        Raoul Brewer PT, DPT

## 2025-03-11 ENCOUNTER — OFFICE VISIT (OUTPATIENT)
Dept: INTERNAL MEDICINE | Facility: CLINIC | Age: 35
End: 2025-03-11
Payer: MEDICAID

## 2025-03-11 VITALS
SYSTOLIC BLOOD PRESSURE: 134 MMHG | HEART RATE: 104 BPM | HEIGHT: 60 IN | WEIGHT: 157.63 LBS | BODY MASS INDEX: 30.95 KG/M2 | DIASTOLIC BLOOD PRESSURE: 88 MMHG | OXYGEN SATURATION: 99 %

## 2025-03-11 DIAGNOSIS — E55.9 VITAMIN D DEFICIENCY: ICD-10-CM

## 2025-03-11 DIAGNOSIS — H93.A2 PULSATILE TINNITUS OF LEFT EAR: Primary | ICD-10-CM

## 2025-03-11 DIAGNOSIS — L70.0 ACNE VULGARIS: ICD-10-CM

## 2025-03-11 PROCEDURE — 1159F MED LIST DOCD IN RCRD: CPT | Mod: CPTII,,, | Performed by: FAMILY MEDICINE

## 2025-03-11 PROCEDURE — 1160F RVW MEDS BY RX/DR IN RCRD: CPT | Mod: CPTII,,, | Performed by: FAMILY MEDICINE

## 2025-03-11 PROCEDURE — 3079F DIAST BP 80-89 MM HG: CPT | Mod: CPTII,,, | Performed by: FAMILY MEDICINE

## 2025-03-11 PROCEDURE — 99999 PR PBB SHADOW E&M-EST. PATIENT-LVL IV: CPT | Mod: PBBFAC,,, | Performed by: FAMILY MEDICINE

## 2025-03-11 PROCEDURE — 99214 OFFICE O/P EST MOD 30 MIN: CPT | Mod: S$PBB,,, | Performed by: FAMILY MEDICINE

## 2025-03-11 PROCEDURE — 99214 OFFICE O/P EST MOD 30 MIN: CPT | Mod: PBBFAC | Performed by: FAMILY MEDICINE

## 2025-03-11 PROCEDURE — 3008F BODY MASS INDEX DOCD: CPT | Mod: CPTII,,, | Performed by: FAMILY MEDICINE

## 2025-03-11 PROCEDURE — 3075F SYST BP GE 130 - 139MM HG: CPT | Mod: CPTII,,, | Performed by: FAMILY MEDICINE

## 2025-03-11 RX ORDER — TRETINOIN 1 MG/G
CREAM TOPICAL NIGHTLY
Qty: 45 G | Refills: 3 | Status: SHIPPED | OUTPATIENT
Start: 2025-03-11

## 2025-03-11 RX ORDER — ERGOCALCIFEROL 1.25 MG/1
50000 CAPSULE ORAL
Qty: 12 CAPSULE | Refills: 1 | Status: SHIPPED | OUTPATIENT
Start: 2025-03-11 | End: 2025-09-07

## 2025-03-11 NOTE — PROGRESS NOTES
Subjective:     Patient ID: Ermelinda Trivedi is a 34 y.o. female.   Chief Complaint: Tinnitus    HPI:  History of Present Illness    CHIEF COMPLAINT:  Patient presents today for follow up of left ear whooshing sound.    She reports pulsatile tinnitus in the left ear, described as a rhythmic whooshing sound synchronous with heartbeat, occurring when turning her head far to the right. She experienced bilateral high pitched non-pulsatile tinnitus which began after RSV and severe ear infection in December 2023. While the sound intensity has decreased over time, it persists. She also experiences occasional sharp, lightning-like pain in the left ear.     She reports prescription Vitamin D improves her mood and fatigue symptoms compared to OTC formulation. Last vitamin D was 33, up from 20.     She is requesting refill of Retin-A cream for acne management.        Review of Systems   Constitutional:  Negative for chills and fever.   HENT:  Positive for tinnitus. Negative for nasal congestion, ear pain, hearing loss and sore throat.    Eyes:  Negative for visual disturbance.   Respiratory:  Negative for cough and shortness of breath.    Cardiovascular:  Negative for chest pain.   Gastrointestinal:  Negative for constipation, diarrhea, nausea and vomiting.   Genitourinary:  Negative for dysuria and frequency.   Integumentary:  Negative for rash.   Neurological:  Negative for headaches.   Psychiatric/Behavioral:  Negative for suicidal ideas.    All other systems reviewed and are negative.         Objective:      Vitals:    03/11/25 1532 03/11/25 1549   BP: 134/88    BP Location: Left arm    Patient Position: Sitting    Pulse: (!) 117 104   SpO2: 99%    Weight: 71.5 kg (157 lb 10.1 oz)    Height: 5' (1.524 m)       Physical Exam  Vitals reviewed.   Constitutional:       General: She is not in acute distress.     Appearance: Normal appearance. She is not ill-appearing.   HENT:      Head: Normocephalic and atraumatic.      Right  Ear: Tympanic membrane, ear canal and external ear normal. No middle ear effusion. There is no impacted cerumen. Tympanic membrane is not erythematous or bulging.      Left Ear: Tympanic membrane, ear canal and external ear normal.  No middle ear effusion. There is no impacted cerumen. Tympanic membrane is not erythematous or bulging.      Nose: Nose normal.   Cardiovascular:      Rate and Rhythm: Normal rate and regular rhythm.      Pulses: Normal pulses.      Heart sounds: Normal heart sounds. No murmur heard.     No friction rub. No gallop.   Pulmonary:      Effort: Pulmonary effort is normal. No respiratory distress.      Breath sounds: Normal breath sounds. No stridor. No wheezing, rhonchi or rales.   Musculoskeletal:      Cervical back: Normal range of motion.   Neurological:      General: No focal deficit present.      Mental Status: She is alert and oriented to person, place, and time. Mental status is at baseline.   Psychiatric:         Mood and Affect: Mood normal.         Behavior: Behavior normal.         Thought Content: Thought content normal.         Judgment: Judgment normal.           Assessment/Plan:             ICD-10-CM ICD-9-CM   1. Pulsatile tinnitus of left ear  H93.A2 388.30   2. Vitamin D deficiency  E55.9 268.9   3. Acne vulgaris  L70.0 706.1     Orders Placed This Encounter   Procedures    Ambulatory referral/consult to ENT       Assessment & Plan    - Evaluated patient's report of rhythmic whooshing sound in left ear; distinct from prior bilateral high pitched tinnitus  - Considered tinnitus potentially due to hearing apparatus damage from severe ear infection  - Assessed persistent unilateral symptoms as warranting further investigation  - Performed brief ear exam, finding no visible abnormalities    1. Pulsatile tinnitus of left ear (Primary)  Assessment as above  D/t unilateral pulsatile tinnitus, will ref to ENT for definitive eval and mgmt  - Ambulatory referral/consult to ENT;  Future    2. Vitamin D deficiency  Refilling vitamin D, plan to recheck level in 6 months  - ergocalciferol (ERGOCALCIFEROL) 50,000 unit Cap; Take 1 capsule (50,000 Units total) by mouth every 7 days.  Dispense: 12 capsule; Refill: 1    3. Acne vulgaris  Refill retin-a as requested  - tretinoin (RETIN-A) 0.1 % cream; Apply topically every evening.  Dispense: 45 g; Refill: 3          No follow-ups on file.     Chadd Lynn MD, [facility]  Family Medicine Physician  [facility]  03/11/2025      This note was generated with the assistance of ambient listening technology. Verbal consent was obtained by the patient and accompanying visitor(s) for the recording of patient appointment to facilitate this note. I attest to having reviewed and edited the generated note for accuracy, though some syntax or spelling errors may persist. Please contact the author of this note for any clarification.

## 2025-03-12 ENCOUNTER — TELEPHONE (OUTPATIENT)
Dept: BEHAVIORAL HEALTH | Facility: CLINIC | Age: 35
End: 2025-03-12
Payer: MEDICAID

## 2025-03-20 ENCOUNTER — OFFICE VISIT (OUTPATIENT)
Dept: BEHAVIORAL HEALTH | Facility: CLINIC | Age: 35
End: 2025-03-20
Payer: MEDICAID

## 2025-03-20 ENCOUNTER — PATIENT MESSAGE (OUTPATIENT)
Dept: BEHAVIORAL HEALTH | Facility: CLINIC | Age: 35
End: 2025-03-20
Payer: COMMERCIAL

## 2025-03-20 DIAGNOSIS — F41.1 GENERALIZED ANXIETY DISORDER: ICD-10-CM

## 2025-03-20 PROCEDURE — 90791 PSYCH DIAGNOSTIC EVALUATION: CPT | Mod: AJ,HB,95,

## 2025-03-20 NOTE — PROGRESS NOTES
"Primary Care Behavioral Health Integration: Initial  Diagnostic Interview - CPT 73531  Date:  3/24/2025  Referral Source:  Chadd Lynn MD  Length of Appointment: 60  The patient location is:  [address]   [id number]   [place] [address]   The patient phone number is: [phone]  Visit type: Virtual visit with synchronous audio and video    Each patient to whom he or she provides medical services by telemedicine is:  (1) informed of the relationship between the physician and patient and the respective role of any other health care provider with respect to management of the patient; and (2) notified that he or she may decline to receive medical services by telemedicine and may withdraw from such care at any time.    Chief Complaint/Reason for Encounter:  Anxiety    History of Present Illness: Ermelinda Trivedi, a 34 y.o. female referred by Chadd Lynn MD.  Patient was seen, examined and chart was reviewed. Met with patient. LCSW provided a description of the [facility] program, and reviewed confidentialty and limits to confidentiality. Pt's emergency contact information was verified. Discussed safety planning in the event patient experiences suicidal or homicidal ideation.    Current Session:  Pt presented to [facility] program with chief complaint of anxiety. Pt reported anxiety symptoms began in the Fall of 2024. Current anxiety symptoms include: increased irritability, racing thoughts, muscle tension, excessive worrying, restlessness, difficulty sleeping and panic attacks. 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 Pt believes this may have been the catalyst for increased anxiety. Pt reported getting a strong sense of "impending doom" while in class and experiencing a panic attack. Pt reported she now experiences heightened anxiety whenever she needs to leave the house. Pt reported she worries about experiencing a panic attack while outside her home and passing out. Pt " "also worries about embarrassing herself by having a panic attack while in public. Pt reported the sense of having a lack of control fuels her anxiety. Due to anxiety, pt avoids leaving the house and going places without her boyfriend. Pt reported she also experiences anxiety while at home. This anxiety typically manifests at worry about her health. Pt is currently working part-time and reported her anxiety feels more manageable at work, due to knowing she "has to do it." Pt denied experiencing any thoughts of suicide, homicide, or self-injurious behavior.     Pt plans to read psychoeducation on anxiety and practice deep breathing and progressive muscle relaxation. Next appointment, LCSW will introduce CBT, assign thought log homework, and discussed behavioral goals to reduce avoidance. Will also discuss if pt is open to exploring medication options. Pt has three more years of school and is worried she will need to withdraw if her anxiety doesn't become more manageable.       No past medical history on file.    Current Medications[1]    Current symptoms:  Depression Symptoms: dysphoric mood, insomnia, and fatigue. Sadness over change in ability to function due to anxiety. Pt denied anhedonia and reported motivation/enjoyment in usual activities  Anxiety Symptoms: Increased irritability, racing thoughts, muscle tension, excessive worrying, restlessness, and panic attacks. Pt reported her symptoms are more intense before her menstrual cycle.  Sleep Difficulties: early morning awakening and frequent night time awakening. Typically seeps for 3-4 hours before waking up. Racing thoughts makes it difficult to fall back asleep.   Manic Symptoms:  denies.  Psychosis: denies .    Risk assessment:  Patient reports no suicidal ideation  Patient reports no homicidal ideation  Patient reports no self-injurious behavior  Patient reports no violent behavior    Patient advised to call [phone] or present the the nearest ED if they " experience suicidal or homicidal ideation, plan or intent.      Psychiatric History:  Diagnosis: No prior diagnosis   Current Psychiatric Medication: None. Pt worried about taking mood altering medication   Medication Trial History:  Medication Trials: No    Outpatient Treatment: Yes -  in high school    Inpatient Treatment: No   Suicide Attempts: No prior thoughts or attempts   Access to Firearms: No   History of Trauma: No   Family Psychiatric History: None     Current and Past Substance Use:  Alcohol: Patient denies alcohol use.    Drugs: Denied.   Nicotine: denied   Caffeine:  No current caffeine use     Mental Status Exam  General Appearance:  appears stated age, neatly dressed, well groomed   Speech: normal tone, normal rate, normal pitch, normal volume      Level of Cooperation: cooperative      Thought Processes: linear, logical, goal-directed   Mood: euthymic      Thought Content: {relevant and appropriate   Affect: congruent and appropriate   Orientation: Oriented x4   Memory/Attention/Concentration: No gross cognitive deficits made evident during conversation   Judgment & Insight: good   Language  intact         3/20/2025    12:56 PM   Results of the PHQ8   Little interest or pleasure in doing things Several days   Feeling down, depressed, or hopeless Several days   Trouble falling or staying asleep, or sleeping too much Several days   Feeling tired or having little energy Nearly every day   Poor appetite or overeating Not at all   Feeling bad about yourself - or that you are a failure or have let yourself or your family down Not at all   Trouble concentrating on things, such as reading the newspaper or watching television Several days   Moving or speaking so slowly that other people could have noticed. Or the opposite - being so fidgety or restless that you have been moving around a lot more than usual Several days   Total Score  8           3/20/2025    12:55 PM 2/3/2025     1:23 PM   GAD7    1. Feeling nervous, anxious, or on edge? 3 3   2. Not being able to stop or control worrying? 3 3   3. Worrying too much about different things? 3 3   4. Trouble relaxing? 3 2   5. Being so restless that it is hard to sit still? 2 1   6. Becoming easily annoyed or irritable? 3 3   7. Feeling afraid as if something awful might happen? 3 3   8. If you checked off any problems, how difficult have these problems made it for you to do your work, take care of things at home, or get along with other people? 1 1   EUGENE-7 Score 20  18       Patient-reported       Impression: Initial appointment focused on gathering history, identifying treatment goals and developing a treatment plan.      Diagnosis:  1. Generalized anxiety disorder  Ambulatory referral/consult to Primary Care Behavioral Health (Non-Opioids)          Treatment Goals and Plan:   Anxiety: eliminating avoidance (specify -leaving home), reducing negative automatic thoughts, reducing physical symptoms of anxiety, and reducing time spent worrying (<30 minutes/day)    Future treatment will utilize CBT, Mindfulness Techniques, and Solution-focused Therapy.      Return to Clinic: 2 weeks         [1]   Current Outpatient Medications:     ergocalciferol (ERGOCALCIFEROL) 50,000 unit Cap, Take 1 capsule (50,000 Units total) by mouth every 7 days., Disp: 12 capsule, Rfl: 1    tretinoin (RETIN-A) 0.1 % cream, Apply topically every evening., Disp: 45 g, Rfl: 3

## 2025-03-24 ENCOUNTER — PATIENT MESSAGE (OUTPATIENT)
Dept: REHABILITATION | Facility: HOSPITAL | Age: 35
End: 2025-03-24
Payer: COMMERCIAL

## 2025-03-24 ENCOUNTER — PATIENT MESSAGE (OUTPATIENT)
Dept: BEHAVIORAL HEALTH | Facility: CLINIC | Age: 35
End: 2025-03-24
Payer: COMMERCIAL

## 2025-03-25 ENCOUNTER — OFFICE VISIT (OUTPATIENT)
Dept: BEHAVIORAL HEALTH | Facility: CLINIC | Age: 35
End: 2025-03-25
Payer: MEDICAID

## 2025-03-25 DIAGNOSIS — F41.1 GENERALIZED ANXIETY DISORDER: Primary | ICD-10-CM

## 2025-03-25 PROCEDURE — 90837 PSYTX W PT 60 MINUTES: CPT | Mod: AJ,HB,,

## 2025-03-25 NOTE — PROGRESS NOTES
Primary Care Behavioral Health Integration: Follow-up  Date:  3/25/2025  Patient Name: Ermelinda Trivedi  Referral Source:  Chadd Lynn MD  Type of Visit:  In person  Site:  [facility]    History of Present Illness:  Ermelinda Trivedi, a 34 y.o.  female with history of Anxiety disorders; generalized anxiety disorder [F41.1] referred by Chadd Lynn MD.  Patient was seen, examined and chart was reviewed.    Met with patient.     Current session:   Pt discussed difficulty leaving the house by herself. Pt reported her boyfriend is a source of comfort and it's easier for her to leave home when she is with him. Pt discussed worries and negative thoughts she experiences when in public. LCSW provided introduction to CBT and discussed principles of cognitive triangle with pt. Provided examples of how thoughts and behaviors influence emotions and anxiety levels. Discussed with pt behaviors she knows helps reduce anxiety, such as getting exercise or going on walks. Discussed reducing avoidance to help reduce anxiety. Pt plans to complete a worksheet to help identify negative automatic thoughts arising during moments of heightened anxiety. Pt plans to read psychoeducation on anxiety. Pt plans to practice deep breathing and progressive muscle relaxation. Pt has a goal to take a few walks around the park to increase her physical activity level.         3/20/2025    12:56 PM   Results of the PHQ8   Little interest or pleasure in doing things Several days   Feeling down, depressed, or hopeless Several days   Trouble falling or staying asleep, or sleeping too much Several days   Feeling tired or having little energy Nearly every day   Poor appetite or overeating Not at all   Feeling bad about yourself - or that you are a failure or have let yourself or your family down Not at all   Trouble concentrating on things, such as reading the newspaper or watching television Several days   Moving or speaking so slowly that other  people could have noticed. Or the opposite - being so fidgety or restless that you have been moving around a lot more than usual Several days   Total Score  8           3/20/2025    12:55 PM 2/3/2025     1:23 PM   GAD7   1. Feeling nervous, anxious, or on edge? 3 3   2. Not being able to stop or control worrying? 3 3   3. Worrying too much about different things? 3 3   4. Trouble relaxing? 3 2   5. Being so restless that it is hard to sit still? 2 1   6. Becoming easily annoyed or irritable? 3 3   7. Feeling afraid as if something awful might happen? 3 3   8. If you checked off any problems, how difficult have these problems made it for you to do your work, take care of things at home, or get along with other people? 1 1   EUGENE-7 Score 20  18       Patient-reported       Mental Status Exam  General Appearance:  unremarkable, age appropriate     Speech: normal tone, normal rate, normal pitch, normal volume         Level of Cooperation: cooperative        Thought Processes: normal and logical      Mood: euthymic        Thought Content: normal, no suicidality, no homicidality, delusions, or paranoia     Affect: congruent and appropriate    Orientation: Oriented x3     Memory: recent >  intact, remote >  intact     Attention Span & Concentration: intact     Fund of General Knowledge: intact and appropriate to age and level of education   Abstract Reasoning: interpretation of similarities was abstract   Judgment & Insight: good       Language:  intact       Treatment plan:  Target symptoms: anxiety   Why chosen therapy is appropriate versus another modality: relevant to diagnosis, patient responds to this modality, evidence based practice  Outcome monitoring methods: self-report, checklist/rating scale  Therapeutic intervention type: insight oriented psychotherapy, behavior modifying psychotherapy, supportive psychotherapy    Risk parameters:  Patient reports no suicidal ideation  Patient reports no homicidal  ideation  Patient reports no self-injurious behavior  Patient reports no violent behavior    Verbal deficits: None    Patient's response to intervention:  The patient's response to intervention is accepting.    Progress toward goals and other mental status changes:  The patient's progress toward goals is good.    Patient advised to call [phone] or present the the nearest ED if they experience suicidal or homicidal ideation, plan or intent.       Impression:  Pt presented to [facility] program with chief complaint of anxiety. Current anxiety symptoms include: increased irritability, racing thoughts, muscle tension, excessive worrying, restlessness, difficulty sleeping and panic attacks. Pt has difficulty leaving the house and feeling out of control. Pt would benefit from CBT and mindfulness based interventions.     Diagnosis:   1. Generalized anxiety disorder            Treatment Goals and Plan: Pt plans to continue individual therapy. Pt plans to ready psychoeducation on anxiety, complete thought log, practice deep breathing, and progressive muscle relaxation.    Future treatment will utilize CBT, Mindfulness Techniques, and Solution-focused Therapy    Return to Clinic: 2 weeks    Length of Appointment: 60 Non face-to-face clinical time: 15

## 2025-03-31 NOTE — PROGRESS NOTES
"[facility]   Physical Therapy Treatment Note:      Name: Ermelinda Trivedi  Clinic Number: [id number]    Therapy Diagnosis:   Encounter Diagnoses   Name Primary?    Decreased range of motion of left shoulder Yes    Weakness of left shoulder     Scapular dysfunction     Decreased range of motion of thoracic spine      Physician: Chadd Lynn MD    Visit Date: 4/1/2025  Physician Orders: PT Eval and Treat   Medical Diagnosis from Referral:   M25.512,G89.29 (ICD-10-CM) - Chronic left shoulder pain   Evaluation Date: 12/9/2024  Authorization Period Expiration: 12/31/24  Plan of Care Expiration: 4/9/2024  Progress Note Due: 3/19/2024   Visit # / Visits authorized: 8/16   FOTO: 1/3  PTA Visit #: 0/5   Precautions: Standard   Time In: 5:08 pm   Time Out: 6:08 pm   Total Billable Time: 60 minutes (38 Minutes One on One)     Subjective   Pt reports: That she is doing okay but is just very stressed and anxious and feels a lot of tension through her shoulders and she thinks that is really what is contributing to her symptoms.  She feels "float" at different time especially when turning her head quickly but doesn't describe it as dizzy.      She was not compliant with home exercise program.  Response to previous treatment: Improvement in Pain Levels   Functional change: No Change     Pain: 5/10  Location: Left Shoulder      Objective      Objective Measures updated at progress report unless specified.     Treatment     Ermelinda received the treatments listed below:      therapeutic exercises to develop strength, endurance, ROM, flexibility, and core stabilization for 49 minutes including:  UBE 1 Min FWD and 1 Min BWD 8 Minutes Level 4  Patient Education   Chin Tuck with Biofeedback 1 x 10 10 Sec Holds   Chin Tuck with Biofeedback 1 x 15 3#   Side Lying External Rotation 3# 3 x 10 2 Sec Holds   Serratus Hand Heel Rock Backs 3 x 8   Upper Trap Level 3 2 x 12 3 Sec Holds   Shoulder Elevation/Shoulder Shrug " with Finger Wiggles 2 x 10   Patient Education      Not Today:   Supine Serratus Punch 5# 3 x 8 3 Sec Holds   Supine Horizontal Abduction/Ts GTB 2 x 12 2 Sec Holds  Serratus Wall Slides 2 x 12   Rhythmic Stabilization 3 x 30   Rhythmic Stabilization at 90/90 ER/IR 3 x 30   Shoulder Isometrics 10 x 10 Sec      - Flexion      - Extension      - External Rotation      - Internal Rotation    Seated Thoracic Extension 1 x 15 5 Sec Holds     manual therapy techniques: The techniques below were applied for 11 minutes:     Cervical Side Glides Grade II-III (Right > Left)   OA Flexion Grade III  OA Side Bending III-IV  Sub Occipital Release     Not Today:  Soft Tissue Mobilization to Cervical Paraspinals   Upper Cervical Rotation MET (Right)    C1 Mobilization on Left with Chin Tuck   Supine Thoracic HVLAT   Posterior Moon Grade III-IV   Inferior Moon Grade II-III  Centering Moon with ER/IR MET     hot pack for 0 minutes to .    cold pack for 0 minutes to .    Patient Education and Home Exercises       Education provided:   - HEP   - Plan of Care   - Explanation of Findings     Written Home Exercises Provided: Patient instructed to cont prior HEP. Exercises were reviewed and Ermelinda was able to demonstrate them prior to the end of the session.  Ermelinda demonstrated good  understanding of the education provided. See EMR under Patient Instructions for exercises provided during therapy sessions    Assessment   Ermelinda presents to physical therapy with continued anxiety about shoulder pain and motions.  Time was taken to discuss sleeping positions and methods to move heavy boxes while she prepares to move.  Manual interventions were performed with focus on cervical spine to continue to address slight mobility restrictions leading to decreased neural mobility present.  Overall shoulder range of motion has shown good full range.  She does have notable fatigability and some strength deficits present through the shoulder and will  continue to address movement coordination and strength deficits present in the shoulder complex but she will additionally benefit from stress reduction and anxiety management which she is working on and it is believed that she will see dividends.       Crixtin Is progressing well towards her goals.   Pt prognosis is Good.     Pt will continue to benefit from skilled outpatient physical therapy to address the deficits listed in the problem list box on initial evaluation, provide pt/family education and to maximize pt's level of independence in the home and community environment.     Pt's spiritual, cultural and educational needs considered and pt agreeable to plan of care and goals.     Anticipated barriers to physical therapy: Hypermobility of the Shoulder     Goals:  Short Term Goals (4 Weeks):   1. Pt will be independent with HEP to supplement PT in improving functional use of R UE.  2. Pt will increase pain free shoulder elevation AROM to >/= 160 deg to improve functional mobility of UE  3. Pt will increase shoulder ER PROM in 0 deg abduction to >/=75 deg to improve functional mobility of UE  4. Pt will improve shoulder MMTs by 1/2 grade to promote equal use of B UEs in performing functional tasks.  Long Term Goals (8 Weeks):   1. Pt will improve FOTO score to </=TBD% limited to decrease perceived limitation with carrying, moving, and handling objects.  2. Pt will increase shoulder AROM to WFL in all planes to improve functional use of R RUE.  3. Pt will improve shoulder MMTs by 1 grade to promote equal use of B UEs in performing functional tasks.  5. Pt will lift 10 lb objects without pain to promote functional QOL.  6. Pt to report pain </= 0/10 with ADLs and IADLs using UE to improve functional QOL.   Plan      Plan of care Certification: 12/9/2024 to 2/9/2024.     Outpatient Physical Therapy 2 times weekly for 8 weeks to include the following interventions: Cervical/Lumbar Traction, Electrical Stimulation ,  Gait Training, Manual Therapy, Moist Heat/ Ice, Neuromuscular Re-ed, Orthotic Management and Training, Patient Education, Self Care, Therapeutic Activities, and Therapeutic Exercise.        Raoul Brewer PT, DPT

## 2025-04-01 ENCOUNTER — CLINICAL SUPPORT (OUTPATIENT)
Dept: REHABILITATION | Facility: HOSPITAL | Age: 35
End: 2025-04-01
Payer: MEDICAID

## 2025-04-01 DIAGNOSIS — R29.898 WEAKNESS OF LEFT SHOULDER: ICD-10-CM

## 2025-04-01 DIAGNOSIS — M89.9 SCAPULAR DYSFUNCTION: ICD-10-CM

## 2025-04-01 DIAGNOSIS — M53.84 DECREASED RANGE OF MOTION OF THORACIC SPINE: ICD-10-CM

## 2025-04-01 DIAGNOSIS — M25.612 DECREASED RANGE OF MOTION OF LEFT SHOULDER: Primary | ICD-10-CM

## 2025-04-01 PROCEDURE — 97110 THERAPEUTIC EXERCISES: CPT

## 2025-04-08 ENCOUNTER — PATIENT MESSAGE (OUTPATIENT)
Dept: BEHAVIORAL HEALTH | Facility: CLINIC | Age: 35
End: 2025-04-08
Payer: MEDICAID

## 2025-04-09 ENCOUNTER — PATIENT MESSAGE (OUTPATIENT)
Dept: BEHAVIORAL HEALTH | Facility: CLINIC | Age: 35
End: 2025-04-09
Payer: MEDICAID

## 2025-04-09 ENCOUNTER — OFFICE VISIT (OUTPATIENT)
Dept: BEHAVIORAL HEALTH | Facility: CLINIC | Age: 35
End: 2025-04-09
Payer: MEDICAID

## 2025-04-09 DIAGNOSIS — F41.1 GENERALIZED ANXIETY DISORDER: Primary | ICD-10-CM

## 2025-04-09 PROCEDURE — 90837 PSYTX W PT 60 MINUTES: CPT | Mod: AJ,HB,95,

## 2025-04-09 NOTE — PROGRESS NOTES
Primary Care Behavioral Health Integration: Follow-up  Date:  4/9/2025  Patient Name: Ermelinda Trivedi  Type of Visit:  Video Session  Site: [facility]  Referral Source:  Chadd Lynn MD    Visit type: Virtual visit with synchronous audio and video  The patient location is:  Home [address] [place] [address] [place] [address]  The patient location [place] is: [facility]  The patient phone number is: [phone]    Each patient to whom he or she provides medical services by telemedicine is:  (1) informed of the relationship between the physician and patient and the respective role of any other health care provider with respect to management of the patient; and (2) notified that he or she may decline to receive medical services by telemedicine and may withdraw from such care at any time.    History of Present Illness:  Ermelinda Trivedi, a 34 y.o.  female with history of Anxiety disorders; generalized anxiety disorder [F41.1] referred by Chadd Lynn MD.  Patient was seen, examined and chart was reviewed.    Met with patient.     Current session:   Pt discussed stress due to moving apartments. Pt reported an increase in anxiety and worry due to moving. Pt discussed positive aspects of her new apartment and reasons she chose to move. However, pt reported lately she has been focused on what could go wrong and reasons she may not be happy in the new apartment. Pt reported racing thoughts and worry have been impacting pt's sleep. Pt has been practicing progressive muscle relaxation and has found this to be helpful. LCSW and pt discussed the impact negative/catastrophizing thoughts have on anxiety levels. LCSW guided pt through a cognitive restructuring practice regarding some of her worries. Pt also discussed difficulty being in the present moment. Pt is often worried and focused on the future. LCSW guided pt through a mindfulness exercise to help increase awareness of the present moment.    LCSW and pt discussed  option of starting medication to help manage anxiety symptoms. Pt wants to continue therapy for a few more sessions before electing to start medication. Pt plans to practice mindfulness exercise and complete cognitive distortion worksheet.           4/9/2025     3:02 PM 3/20/2025    12:56 PM   Results of the PHQ8   Little interest or pleasure in doing things Not at all Several days   Feeling down, depressed, or hopeless Several days Several days   Trouble falling or staying asleep, or sleeping too much Nearly every day Several days   Feeling tired or having little energy Nearly every day Nearly every day   Poor appetite or overeating Not at all Not at all   Feeling bad about yourself - or that you are a failure or have let yourself or your family down Several days Not at all   Trouble concentrating on things, such as reading the newspaper or watching television Not at all Several days   Moving or speaking so slowly that other people could have noticed. Or the opposite - being so fidgety or restless that you have been moving around a lot more than usual Several days Several days   Total Score  9 8           4/9/2025     3:01 PM 3/20/2025    12:55 PM 2/3/2025     1:23 PM   GAD7   1. Feeling nervous, anxious, or on edge? 3 3 3   2. Not being able to stop or control worrying? 3 3 3   3. Worrying too much about different things? 3 3 3   4. Trouble relaxing? 3 3 2   5. Being so restless that it is hard to sit still? 3 2 1   6. Becoming easily annoyed or irritable? 3 3 3   7. Feeling afraid as if something awful might happen? 3 3 3   8. If you checked off any problems, how difficult have these problems made it for you to do your work, take care of things at home, or get along with other people? 1 1 1   EUGENE-7 Score 21  20  18       Patient-reported       Mental Status Exam  General Appearance:  unremarkable, age appropriate     Speech: normal tone, normal rate, normal pitch, normal volume         Level of Cooperation:  cooperative        Thought Processes: normal and logical      Mood: steady        Thought Content: normal, no suicidality, no homicidality, delusions, or paranoia     Affect: congruent and appropriate    Orientation: Oriented x3     Memory: recent >  intact, remote >  intact     Attention Span & Concentration: intact     Fund of General Knowledge: intact and appropriate to age and level of education   Abstract Reasoning: interpretation of similarities was abstract   Judgment & Insight: good       Language:  intact       Treatment plan:  Target symptoms: anxiety   Why chosen therapy is appropriate versus another modality: relevant to diagnosis, patient responds to this modality, evidence based practice  Outcome monitoring methods: self-report, checklist/rating scale  Therapeutic intervention type: insight oriented psychotherapy, behavior modifying psychotherapy, supportive psychotherapy    Risk parameters:  Patient reports no suicidal ideation  Patient reports no homicidal ideation  Patient reports no self-injurious behavior  Patient reports no violent behavior    Verbal deficits: None    Patient's response to intervention:  The patient's response to intervention is accepting.    Progress toward goals and other mental status changes:  The patient's progress toward goals is good.    Patient advised to call [phone] or present the the nearest ED if they experience suicidal or homicidal ideation, plan or intent.       Impression:  Pt presented to [facility] program with chief complaint of anxiety. Current anxiety symptoms include: increased irritability, racing thoughts, muscle tension, excessive worrying, restlessness, difficulty sleeping and panic attacks. Pt has difficulty leaving the house and feeling out of control. Pt would benefit from CBT and mindfulness based interventions.     Diagnosis:   1. Generalized anxiety disorder            Treatment Goals and Plan: Pt plans to continue individual therapy. Pt plans to  practice mindfulness and utilize CBT worksheets.    Future treatment will utilize CBT, Mindfulness Techniques, and Solution-focused Therapy    Return to Clinic: 2 weeks      Length of Appointment: 60 Non face-to-face clinical time: 15

## 2025-04-15 ENCOUNTER — PATIENT MESSAGE (OUTPATIENT)
Dept: REHABILITATION | Facility: HOSPITAL | Age: 35
End: 2025-04-15
Payer: MEDICAID

## 2025-04-21 ENCOUNTER — OFFICE VISIT (OUTPATIENT)
Dept: BEHAVIORAL HEALTH | Facility: CLINIC | Age: 35
End: 2025-04-21
Payer: MEDICAID

## 2025-04-21 DIAGNOSIS — F41.1 GENERALIZED ANXIETY DISORDER: Primary | ICD-10-CM

## 2025-04-21 PROCEDURE — 90837 PSYTX W PT 60 MINUTES: CPT | Mod: AJ,HB,95,

## 2025-04-21 NOTE — PROGRESS NOTES
Primary Care Behavioral Health Integration: Follow-up  Date:  4/22/2025  Patient Name: Ermelinda Trivedi  Type of Visit:  Video Session  Site: [facility]  Referral Source:  Chadd Lynn MD    Visit type: Virtual visit with synchronous audio and video  The patient location is:  Home [address] [place] [address] [place] [address]  The patient location [place] is: [facility]  The patient phone number is: [phone]    Each patient to whom he or she provides medical services by telemedicine is:  (1) informed of the relationship between the physician and patient and the respective role of any other health care provider with respect to management of the patient; and (2) notified that he or she may decline to receive medical services by telemedicine and may withdraw from such care at any time.    History of Present Illness:  Ermelinda Trivedi, a 34 y.o.  female with history of Anxiety disorders; generalized anxiety disorder [F41.1] referred by Chadd Lynn MD.  Patient was seen, examined and chart was reviewed.    Met with patient.     Current session:   Pt reported a reduction in anxiety symptoms over the past two weeks. Pt reported she has been noting the accuracy of her worries, which has helped her notice her tendency to catastrophize. Pt discussed how this has helped her reframe and better manage negative thoughts. Pt reported increasing her focus on the positive aspects of moving apartments, instead of focusing on negatives or what could go wrong. Pt has continued utilizing grounding exercises and deep breathing when experiencing physical symptoms of anxiety. Pt has also been opening up to friends and family more about experiencing anxiety, which has been helpful to pt and reduced pt's sense of isolation. LCSW and pt reviewed the cognitive distortion worksheet and cognitive restructuring practices, which pt plans to continue using. Pt reported her primary goal at this time is to become more comfortable  leaving her apartment. Plan to set specific behavioral goals next session regarding this.           4/21/2025     3:00 PM 4/9/2025     3:02 PM 3/20/2025    12:56 PM   Results of the PHQ8   Little interest or pleasure in doing things Not at all Not at all Several days   Feeling down, depressed, or hopeless Not at all Several days Several days   Trouble falling or staying asleep, or sleeping too much More than half the days Nearly every day Several days   Feeling tired or having little energy More than half the days Nearly every day Nearly every day   Poor appetite or overeating Not at all Not at all Not at all   Feeling bad about yourself - or that you are a failure or have let yourself or your family down Not at all Several days Not at all   Trouble concentrating on things, such as reading the newspaper or watching television Not at all Not at all Several days   Moving or speaking so slowly that other people could have noticed. Or the opposite - being so fidgety or restless that you have been moving around a lot more than usual More than half the days Several days Several days   Total Score  6 9 8           4/21/2025     3:00 PM 4/9/2025     3:01 PM 3/20/2025    12:55 PM   GAD7   1. Feeling nervous, anxious, or on edge? 2 3 3   2. Not being able to stop or control worrying? 2 3 3   3. Worrying too much about different things? 3 3 3   4. Trouble relaxing? 3 3 3   5. Being so restless that it is hard to sit still? 2 3 2   6. Becoming easily annoyed or irritable? 2 3 3   7. Feeling afraid as if something awful might happen? 3 3 3   8. If you checked off any problems, how difficult have these problems made it for you to do your work, take care of things at home, or get along with other people? 1 1 1   EUGENE-7 Score 17  21  20        Patient-reported       Mental Status Exam  General Appearance:  unremarkable, age appropriate     Speech: normal tone, normal rate, normal pitch, normal volume         Level of Cooperation:  cooperative        Thought Processes: normal and logical      Mood: steady        Thought Content: normal, no suicidality, no homicidality, delusions, or paranoia     Affect: congruent and appropriate    Orientation: Oriented x3     Memory: recent >  intact, remote >  intact     Attention Span & Concentration: intact     Fund of General Knowledge: intact and appropriate to age and level of education   Abstract Reasoning: interpretation of similarities was abstract   Judgment & Insight: good       Language:  intact       Treatment plan:  Target symptoms: anxiety   Why chosen therapy is appropriate versus another modality: relevant to diagnosis, patient responds to this modality, evidence based practice  Outcome monitoring methods: self-report, checklist/rating scale  Therapeutic intervention type: insight oriented psychotherapy, behavior modifying psychotherapy, supportive psychotherapy    Risk parameters:  Patient reports no suicidal ideation  Patient reports no homicidal ideation  Patient reports no self-injurious behavior  Patient reports no violent behavior    Verbal deficits: None    Patient's response to intervention:  The patient's response to intervention is accepting.    Progress toward goals and other mental status changes:  The patient's progress toward goals is good.    Patient advised to call [phone] or present the the nearest ED if they experience suicidal or homicidal ideation, plan or intent.       Impression:  Pt presented to [facility] program with chief complaint of anxiety. Current anxiety symptoms include: increased irritability, racing thoughts, muscle tension, excessive worrying, restlessness, difficulty sleeping and panic attacks. Pt has difficulty leaving the house and feeling out of control. Pt is responding well to CBT and mindfulness based interventions.     Diagnosis:   1. Generalized anxiety disorder            Treatment Goals and Plan: Pt plans to continue individual therapy. Pt plans to  practice mindfulness and utilize CBT worksheets.    Future treatment will utilize CBT, Mindfulness Techniques, and Solution-focused Therapy    Return to Clinic: 2 weeks      Length of Appointment: 60 Non face-to-face clinical time: 15

## 2025-04-21 NOTE — PROGRESS NOTES
[facility]   Physical Therapy Treatment Note:      Name: Ermelinda Trivedi  [facility] Number: [id number]    Therapy Diagnosis:   Encounter Diagnoses   Name Primary?    Decreased range of motion of left shoulder Yes    Weakness of left shoulder     Scapular dysfunction     Decreased range of motion of thoracic spine      Physician: Chadd Lynn MD    Visit Date: 4/22/2025  Physician Orders: PT Eval and Treat   Medical Diagnosis from Referral:   M25.512,G89.29 (ICD-10-CM) - Chronic left shoulder pain   Evaluation Date: 12/9/2024  Authorization Period Expiration: 12/31/24  Plan of Care Expiration: 4/9/2024  Progress Note Due: 3/19/2024   Visit # / Visits authorized: 9/16   FOTO: 1/3  PTA Visit #: 0/5   Precautions: Standard   Time In: 5:07 pm   Time Out: 6:07 pm   Total Billable Time: 60 minutes (60Minutes One on One)     Subjective   Pt reports: She has been doing well but has been so busy trying to move into her new apartment.  She was nervous about how it was going to treat her but she felt really good and had almost no pain with lifting her boxes and was even carrying things over head.  She is still getting some tingling pain in the left side of the neck when she is sleeping with her head tilted to the side.  She doesn't really have any nerve pain just some aching in the shoulder.     She was not compliant with home exercise program.  Response to previous treatment: Improvement in Pain Levels   Functional change: No Change     Pain: 5/10  Location: Left Shoulder      Objective      Objective Measures updated at progress report unless specified.     Treatment     Ermelinda received the treatments listed below:      therapeutic exercises to develop strength, endurance, ROM, flexibility, and core stabilization for 47 minutes including:  UBE 1 Min FWD and 1 Min BWD 8 Minutes Level 5   Patient Education   Side Lying External Rotation 3# 3 x 10 2 Sec Holds   Prone Mid-Trap Level 1 3 x 8 3 Sec Holds    Prone Low-Trap Level 2 3 x 8 1 Sec Holds   Upper Trap Level 3 2 x 12 3 Sec Holds   Horizontal Abduction/Ts GTB 2 x 12 2 Sec Holds  90 Degree IR and Lift RTB 2 x 12   Serratus Hand Heel Rock Backs 3 x 8     Not Today:   Supine Serratus Punch 5# 3 x 8 3 Sec Holds   Chin Tuck with Biofeedback 1 x 10 10 Sec Holds   Chin Tuck with Biofeedback 1 x 15 3#   Shoulder Elevation/Shoulder Shrug with Finger Wiggles 2 x 10  Serratus Wall Slides 2 x 12   Rhythmic Stabilization 3 x 30   Rhythmic Stabilization at 90/90 ER/IR 3 x 30   Shoulder Isometrics 10 x 10 Sec      - Flexion      - Extension      - External Rotation      - Internal Rotation    Seated Thoracic Extension 1 x 15 5 Sec Holds     manual therapy techniques: The techniques below were applied for 13 minutes:     Cervical Side Glides Grade II-III (Right > Left)   OA Flexion Grade III-IV  Posterior Glide Grade III-IV   Inferior [place] Grade III-IV    Not Today:  OA Side Bending III-IV  Sub Occipital Release   Soft Tissue Mobilization to Cervical Paraspinals   Upper Cervical Rotation MET (Right)    C1 Mobilization on Left with Chin Tuck   Supine Thoracic HVLAT   Centering [place] with ER/IR MET     hot pack for 0 minutes to .    cold pack for 0 minutes to .    Patient Education and Home Exercises       Education provided:   - HEP   - Plan of Care   - Explanation of Findings     Written Home Exercises Provided: Patient instructed to cont prior HEP. Exercises were reviewed and Stephaniein was able to demonstrate them prior to the end of the session.  Stephaniein demonstrated good  understanding of the education provided. See EMR under Patient Instructions for exercises provided during therapy sessions    Assessment   Ermelinda presents to physical therapy with great reports following her her move into her new apartment she does continue to complain of some general aching through the shoulder that worsens with maximal external rotation where she has excessive anterior shearing of the  humeral head and neck pain following prolonged sleeping positions.  Manual interventions were performed to address limitations in posterior and inferior glide of the shoulder and clean up any residual mobility deficits in the cervical spine.  She worked to reinforce mobility deficits with activation, control, and strengthening of the periscapular and rotator cuff musculature and progressed resistance.  She is now able to now equal range of motion with only minimal irritation present through the shoulder at end range motions.  She continues to have limited upward rotation that creates anterior shoulder impingement like symptoms with repetitive overhead motions.  She tolerated the session well and will continue to address remaining deficits and progress as she is able.  She will additionally benefit from stress reduction and anxiety management which she is working on and it is believed that she will see dividends.       Stephaniein Is progressing well towards her goals.   Pt prognosis is Good.     Pt will continue to benefit from skilled outpatient physical therapy to address the deficits listed in the problem list box on initial evaluation, provide pt/family education and to maximize pt's level of independence in the home and community environment.     Pt's spiritual, cultural and educational needs considered and pt agreeable to plan of care and goals.     Anticipated barriers to physical therapy: Hypermobility of the Shoulder     Goals:  Short Term Goals (4 Weeks):   1. Pt will be independent with HEP to supplement PT in improving functional use of R UE.  2. Pt will increase pain free shoulder elevation AROM to >/= 160 deg to improve functional mobility of UE  3. Pt will increase shoulder ER PROM in 0 deg abduction to >/=75 deg to improve functional mobility of UE  4. Pt will improve shoulder MMTs by 1/2 grade to promote equal use of B UEs in performing functional tasks.  Long Term Goals (8 Weeks):   1. Pt will improve  FOTO score to </=TBD% limited to decrease perceived limitation with carrying, moving, and handling objects.  2. Pt will increase shoulder AROM to WFL in all planes to improve functional use of R RUE.  3. Pt will improve shoulder MMTs by 1 grade to promote equal use of B UEs in performing functional tasks.  5. Pt will lift 10 lb objects without pain to promote functional QOL.  6. Pt to report pain </= 0/10 with ADLs and IADLs using UE to improve functional QOL.   Plan      Plan of care Certification: 12/9/2024 to 2/9/2024.     Outpatient Physical Therapy 2 times weekly for 8 weeks to include the following interventions: Cervical/Lumbar Traction, Electrical Stimulation , Gait Training, Manual Therapy, Moist Heat/ Ice, Neuromuscular Re-ed, Orthotic Management and Training, Patient Education, Self Care, Therapeutic Activities, and Therapeutic Exercise.        Raoul Brewer PT, DPT

## 2025-04-22 ENCOUNTER — CLINICAL SUPPORT (OUTPATIENT)
Dept: REHABILITATION | Facility: HOSPITAL | Age: 35
End: 2025-04-22
Payer: MEDICAID

## 2025-04-22 DIAGNOSIS — R29.898 WEAKNESS OF LEFT SHOULDER: ICD-10-CM

## 2025-04-22 DIAGNOSIS — M53.84 DECREASED RANGE OF MOTION OF THORACIC SPINE: ICD-10-CM

## 2025-04-22 DIAGNOSIS — M89.9 SCAPULAR DYSFUNCTION: ICD-10-CM

## 2025-04-22 DIAGNOSIS — M25.612 DECREASED RANGE OF MOTION OF LEFT SHOULDER: Primary | ICD-10-CM

## 2025-04-22 PROCEDURE — 97110 THERAPEUTIC EXERCISES: CPT

## 2025-04-24 ENCOUNTER — CLINICAL SUPPORT (OUTPATIENT)
Dept: AUDIOLOGY | Facility: CLINIC | Age: 35
End: 2025-04-24
Payer: MEDICAID

## 2025-04-24 ENCOUNTER — OFFICE VISIT (OUTPATIENT)
Dept: OTOLARYNGOLOGY | Facility: CLINIC | Age: 35
End: 2025-04-24
Payer: COMMERCIAL

## 2025-04-24 DIAGNOSIS — H93.A9 PULSATILE TINNITUS, UNSPECIFIED EAR: Primary | ICD-10-CM

## 2025-04-24 DIAGNOSIS — H93.A2 PULSATILE TINNITUS OF LEFT EAR: ICD-10-CM

## 2025-04-24 DIAGNOSIS — H90.42 SENSORINEURAL HEARING LOSS (SNHL) OF LEFT EAR WITH UNRESTRICTED HEARING OF RIGHT EAR: Primary | ICD-10-CM

## 2025-04-24 PROCEDURE — 99211 OFF/OP EST MAY X REQ PHY/QHP: CPT | Mod: PBBFAC | Performed by: AUDIOLOGIST

## 2025-04-24 PROCEDURE — 99999 PR PBB SHADOW E&M-EST. PATIENT-LVL I: CPT | Mod: PBBFAC,,, | Performed by: OTOLARYNGOLOGY

## 2025-04-24 PROCEDURE — 92557 COMPREHENSIVE HEARING TEST: CPT | Mod: PBBFAC | Performed by: AUDIOLOGIST

## 2025-04-24 PROCEDURE — 92567 TYMPANOMETRY: CPT | Mod: PBBFAC | Performed by: AUDIOLOGIST

## 2025-04-24 PROCEDURE — 99203 OFFICE O/P NEW LOW 30 MIN: CPT | Mod: S$GLB,,, | Performed by: OTOLARYNGOLOGY

## 2025-04-24 PROCEDURE — 99999 PR PBB SHADOW E&M-EST. PATIENT-LVL I: CPT | Mod: PBBFAC,,, | Performed by: AUDIOLOGIST

## 2025-04-24 NOTE — PROGRESS NOTES
Ermelinda Trivedi, a 34 y.o. female, was seen today in the clinic for an audiologic evaluation.  Patient's main complaint was pulsatile tinnitus. Patient reported pulsatile tinnitus worse in the left ear. Patient reported tinnitus started in December of 2023 following RSV infection and an ear infections. Patient reported tinnitus is perceived when she turns her head to the right side.Patient also reported lightheadedness she attributed to anxiety.    Tympanometry revealed Type A in the right ear and Type A in the left ear.     Audiogram results revealed normal hearing sensitivity in the right ear and slight low frequency sensorineural hearing loss in the left ear.  Speech reception thresholds were noted at 5 dB in the right ear and 15 dB in the left ear.  Speech discrimination scores were 100% in the right ear and 100% in the left ear.    Recommendations:  Otologic evaluation  Annual audiogram  Hearing protection when in noise  Hearing aid consultation

## 2025-04-28 NOTE — PROGRESS NOTES
Outpatient Rehab    Physical Therapy Progress Note : Updated Plan of Care    [facility]   Physical Therapy Treatment Note/Updated Plan of Care/Progress Note:     Name: Ermelinda Trivedi  Clinic Number: [id number]    Therapy Diagnosis:   Encounter Diagnoses   Name Primary?    Decreased range of motion of left shoulder Yes    Weakness of left shoulder     Scapular dysfunction     Decreased range of motion of thoracic spine      Physician: Chadd Lynn MD    Visit Date: 4/29/2025  Physician Orders: PT Eval and Treat   Medical Diagnosis from Referral:   M25.512,G89.29 (ICD-10-CM) - Chronic left shoulder pain   Evaluation Date: 12/9/2024  Authorization Period Expiration: 12/31/24  Plan of Care Expiration: Update to 5/29/2025  Progress Note Due: Update to 5/29/2025  Visit # / Visits authorized: 9/16   FOTO: 1/3 ** Next Visit   PTA Visit #: 0/5   Precautions: Standard   Time In: 5:12 pm   Time Out: 6:07 pm   Total Billable Time: 55 minutes (55 Minutes One on One)     Subjective   Pt reports: She is doing fine today.  She had a lot of soreness after last session for the next day but then it subsided and felt better.  She is feeling pretty good with all of her movements and has been able to sleep more on her shoulder which is a good change but over time it will still wake her up and create an aching pain.  Sometimes she feels some aching in the front and sometimes it is in the back and can radiate to her forearm.      She was not compliant with home exercise program.  Response to previous treatment: Improvement in Pain Levels   Functional change: No Change     Pain: 5/10  Location: Left Shoulder      Objective      Objective Measures updated at progress report unless specified.    Right ROM Right MMT Left ROM Left MMT Notes:   Shoulder Flexion:  (180) 180 4/5 180  4+/5    Shoulder Abduction:  (180) 180 4/5 180  4+/5    Shoulder ER:  (90) 105 4/5 105 4/5    Shoulder IR:  (70) 65 4/5 70 4/5    Lower  Trap:  3+/5  4-/5    Middle Trap:  4-/5  4-/5    Negative Median Nerve Test   Negative Ulnar Nerve Test   Positive Radial Nerve Test     Treatment     Ermelinda received the treatments listed below:      therapeutic exercises to develop strength, endurance, ROM, flexibility, and core stabilization for 41 minutes including:    Assessment as Above   Patient Education   UBE 1 Min FWD and 1 Min BWD 8 Minutes Level 6   Chin Tuck with Biofeedback 1 x 10 10 Sec Holds   Chin Tuck with Biofeedback 1 x 15 3#   Horizontal Abduction/Ts GTB 2 x 12 2 Sec Holds  Upper Trap Level 3 2 x 12 3 Sec Holds   Serratus Wall Slides 2 x 12     Not Today:   Supine Serratus Punch 5# 3 x 8 3 Sec Holds   Side Lying External Rotation 3# 3 x 10 2 Sec Holds   Prone Mid-Trap Level 1 3 x 8 3 Sec Holds   Prone Low-Trap Level 2 3 x 8 1 Sec Holds   90 Degree IR and Lift RTB 2 x 12   Serratus Hand Heel Rock Backs 3 x 8   Shoulder Elevation/Shoulder Shrug with Finger Wiggles 2 x 10  Rhythmic Stabilization 3 x 30   Rhythmic Stabilization at 90/90 ER/IR 3 x 30    Seated Thoracic Extension 1 x 15 5 Sec Holds     manual therapy techniques: The techniques below were applied for 14 minutes:     Cervical Side Glides Grade II-III (Right > Left)   OA Flexion Grade III-IV  OA Side Bending III-IV  Sub Occipital Release   Soft Tissue Mobilization to Cervical Paraspinals   Upper Cervical Rotation MET (Right)      Not Today:  Posterior Glide Grade III-IV   Inferior [place] Grade III-IV  C1 Mobilization on Left with Chin Tuck   Supine Thoracic HVLAT   Centering [place] with ER/IR MET     hot pack for 0 minutes to .    cold pack for 0 minutes to .    Patient Education and Home Exercises       Education provided:   - HEP   - Plan of Care   - Explanation of Findings     Written Home Exercises Provided: Patient instructed to cont prior HEP. Exercises were reviewed and Ermelinda was able to demonstrate them prior to the end of the session.  Ermelinda demonstrated good  understanding  of the education provided. See EMR under Patient Instructions for exercises provided during therapy sessions    Assessment   Ermelinda presents to physical therapy with some continued and variable aching in the shoulder but has shown better tolerance to pressure on the shoulder and with various motions and daily tasks.  Reassessment showed full and equal range of motion and nearly equal strength present through the shoulder with very minimal discomfort present.  There does remain to be a small adverse neural component present that is contributing the varied aching.  She continues to lack periscapular control necessary for control of the shoulder complex with upward rotation.  She will continue to work to address deficits in shoulder stability due to some underlying hypermobility and work to improve control and strengthening of the rotator cuff and periscapular musculature.  She continues to have limited upward rotation that creates anterior shoulder impingement like symptoms with repetitive overhead motions.  She tolerated the session well and will continue to address remaining deficits and progress as she is able.  She has shown good strides but will continue to be a good candidate for skilled interventions. She will additionally benefit from stress reduction and anxiety management which she is working on and it is believed that she will see dividends.       Ermelinda Is progressing well towards her goals.   Pt prognosis is Good.     Pt will continue to benefit from skilled outpatient physical therapy to address the deficits listed in the problem list box on initial evaluation, provide pt/family education and to maximize pt's level of independence in the home and community environment.     Pt's spiritual, cultural and educational needs considered and pt agreeable to plan of care and goals.     Anticipated barriers to physical therapy: Hypermobility of the Shoulder     Goals:  Short Term Goals (4 Weeks):   1. Pt will be  independent with HEP to supplement PT in improving functional use of R UE.  2. Pt will increase pain free shoulder elevation AROM to >/= 160 deg to improve functional mobility of UE  3. Pt will increase shoulder ER PROM in 0 deg abduction to >/=75 deg to improve functional mobility of UE  4. Pt will improve shoulder MMTs by 1/2 grade to promote equal use of B UEs in performing functional tasks.  Long Term Goals (8 Weeks):   1. Pt will improve FOTO score to </=TBD% limited to decrease perceived limitation with carrying, moving, and handling objects.  2. Pt will increase shoulder AROM to WFL in all planes to improve functional use of R RUE.  3. Pt will improve shoulder MMTs by 1 grade to promote equal use of B UEs in performing functional tasks.  5. Pt will lift 10 lb objects without pain to promote functional QOL.  6. Pt to report pain </= 0/10 with ADLs and IADLs using UE to improve functional QOL.   Plan      Plan of care Certification: 12/9/2024 to 2/9/2024.     Outpatient Physical Therapy 2 times weekly for 8 weeks to include the following interventions: Cervical/Lumbar Traction, Electrical Stimulation , Gait Training, Manual Therapy, Moist Heat/ Ice, Neuromuscular Re-ed, Orthotic Management and Training, Patient Education, Self Care, Therapeutic Activities, and Therapeutic Exercise.        Raoul Brewer PT, DPT

## 2025-04-29 ENCOUNTER — CLINICAL SUPPORT (OUTPATIENT)
Dept: REHABILITATION | Facility: HOSPITAL | Age: 35
End: 2025-04-29
Payer: COMMERCIAL

## 2025-04-29 DIAGNOSIS — M89.9 SCAPULAR DYSFUNCTION: ICD-10-CM

## 2025-04-29 DIAGNOSIS — R29.898 WEAKNESS OF LEFT SHOULDER: ICD-10-CM

## 2025-04-29 DIAGNOSIS — M53.84 DECREASED RANGE OF MOTION OF THORACIC SPINE: ICD-10-CM

## 2025-04-29 DIAGNOSIS — M25.612 DECREASED RANGE OF MOTION OF LEFT SHOULDER: Primary | ICD-10-CM

## 2025-04-29 PROCEDURE — 97110 THERAPEUTIC EXERCISES: CPT

## 2025-05-05 ENCOUNTER — OFFICE VISIT (OUTPATIENT)
Dept: BEHAVIORAL HEALTH | Facility: CLINIC | Age: 35
End: 2025-05-05
Payer: COMMERCIAL

## 2025-05-05 ENCOUNTER — HOSPITAL ENCOUNTER (OUTPATIENT)
Dept: RADIOLOGY | Facility: HOSPITAL | Age: 35
Discharge: HOME | End: 2025-05-05
Attending: OTOLARYNGOLOGY
Payer: COMMERCIAL

## 2025-05-05 ENCOUNTER — PATIENT MESSAGE (OUTPATIENT)
Dept: BEHAVIORAL HEALTH | Facility: CLINIC | Age: 35
End: 2025-05-05
Payer: COMMERCIAL

## 2025-05-05 DIAGNOSIS — H93.A9 PULSATILE TINNITUS, UNSPECIFIED EAR: ICD-10-CM

## 2025-05-05 DIAGNOSIS — F41.1 GENERALIZED ANXIETY DISORDER: Primary | ICD-10-CM

## 2025-05-05 PROCEDURE — 70480 CT ORBIT/EAR/FOSSA W/O DYE: CPT | Mod: 26,,, | Performed by: STUDENT IN AN ORGANIZED HEALTH CARE EDUCATION/TRAINING PROGRAM

## 2025-05-05 PROCEDURE — 90837 PSYTX W PT 60 MINUTES: CPT | Mod: 95,,,

## 2025-05-05 PROCEDURE — 70480 CT ORBIT/EAR/FOSSA W/O DYE: CPT | Mod: TC

## 2025-05-05 NOTE — PROGRESS NOTES
Outpatient Rehab    Physical Therapy Visit    [facility]   Physical Therapy Treatment Note:     Name: Ermelinda Trivedi  [facility] Number: [id number]    Therapy Diagnosis:   Encounter Diagnoses   Name Primary?    Decreased range of motion of left shoulder Yes    Weakness of left shoulder     Scapular dysfunction     Decreased range of motion of thoracic spine      Physician: Chadd Lynn MD    Visit Date: 4/29/2025  Physician Orders: PT Eval and Treat   Medical Diagnosis from Referral:   M25.512,G89.29 (ICD-10-CM) - Chronic left shoulder pain   Evaluation Date: 12/9/2024  Authorization Period Expiration: 12/31/24  Plan of Care Expiration: Update to 5/29/2025  Progress Note Due: Update to 5/29/2025  Visit # / Visits authorized: 9/16   FOTO: 2/3  PTA Visit #: 0/5   Precautions: Standard   Time In: 5:15 pm   Time Out: 6:10 pm   Total Billable Time: 55 minutes (38 Minutes One on One)     Subjective   Pt reports: She was informed that she was going to have to change her work schedule and she won't be able to continue to come into physical therapy for the remainder of the month.  She hasn't been very complaint with home exercises but realizes that for the next month she will have to be more compliant since she can't come to physical therapy.  This is also adding a lot of stress to her situation.  She notes that when she maximally externally rotates her arm and pinches her shoulder back she gets some increased pain still.      She was not compliant with home exercise program.  Response to previous treatment: Improvement in Pain Levels   Functional change: No Change     Pain: 5/10  Location: Left Shoulder      Objective      Objective Measures updated at progress report unless specified.       FOTO: 67%     Last Session:    Right ROM Right MMT Left ROM Left MMT Notes:   Shoulder Flexion:  (180) 180 4/5 180  4+/5    Shoulder Abduction:  (180) 180 4/5 180  4+/5    Shoulder ER:  (90) 105 4/5 105 4/5     Shoulder IR:  (70) 65 4/5 70 4/5    Lower Trap:  3+/5  4-/5    Middle Trap:  4-/5  4-/5    Negative Median Nerve Test   Negative Ulnar Nerve Test   Positive Radial Nerve Test     Treatment     Crixtin received the treatments listed below:      therapeutic exercises to develop strength, endurance, ROM, flexibility, and core stabilization for 55 minutes including:    Patient Education   FOTO Assessment  UBE 1 Min FWD and 1 Min BWD 8 Minutes Level 6   Serratus Wall Slides 2 x 12  Horizontal Abduction/Ts GTB 2 x 12 2 Sec Holds  Upper Trap Level 3 2 x 12 3 Sec Holds      Not Today:   Chin Tuck with Biofeedback 1 x 10 10 Sec Holds   Chin Tuck with Biofeedback 1 x 15 3#   Assessment as Above   Supine Serratus Punch 5# 3 x 8 3 Sec Holds   Side Lying External Rotation 3# 3 x 10 2 Sec Holds   Prone Mid-Trap Level 1 3 x 8 3 Sec Holds   Prone Low-Trap Level 2 3 x 8 1 Sec Holds   90 Degree IR and Lift RTB 2 x 12   Serratus Hand Heel Rock Backs 3 x 8   Shoulder Elevation/Shoulder Shrug with Finger Wiggles 2 x 10  Rhythmic Stabilization 3 x 30   Rhythmic Stabilization at 90/90 ER/IR 3 x 30    Seated Thoracic Extension 1 x 15 5 Sec Holds     manual therapy techniques: The techniques below were applied for 0 minutes:     Cervical Side Glides Grade II-III (Right > Left)   OA Flexion Grade III-IV  OA Side Bending III-IV  Sub Occipital Release   Soft Tissue Mobilization to Cervical Paraspinals   Upper Cervical Rotation MET (Right)      Not Today:  Posterior Glide Grade III-IV   Inferior [place] Grade III-IV  C1 Mobilization on Left with Chin Tuck   Supine Thoracic HVLAT   Centering [place] with ER/IR MET     hot pack for 0 minutes to .    cold pack for 0 minutes to .    Patient Education and Home Exercises       Education provided:   - HEP   - Plan of Care   - Explanation of Findings     Written Home Exercises Provided: Patient instructed to cont prior HEP. Exercises were reviewed and Ermelinda was able to demonstrate them prior to the end  of the session.  Ermelinda demonstrated good  understanding of the education provided. See EMR under Patient Instructions for exercises provided during therapy sessions    Assessment   Ermelinda presents to physical therapy with slight aching in the shoulder with maximal external rotation and scapular retraction.  Previous session did reveal some remaining neural tension that can contribute to varied shoulder pain.She continues to lack periscapular control necessary for control of the shoulder complex with upward rotation.  She will continue to work to address deficits in shoulder stability due to some underlying hypermobility and work to improve control and strengthening of the rotator cuff and periscapular musculature.  She will be unable to attend therapy for the remainder of the month and will assess her shoulder presentation in future to determine best course of action.  Time was taken to discuss home exercises and to ensure quality of HEP interventions.  She will continue to work to address deficits at home and will work to self-progress.  In month time patient will be reassessed to continue to address anterior shoulder pain and may need to follow-up with MD for further work-up if shoulder pain continues.  She will additionally benefit from stress reduction and anxiety management which she is working on and it is believed that she will see dividends.       Ermelinda Is progressing well towards her goals.   Pt prognosis is Good.     Pt will continue to benefit from skilled outpatient physical therapy to address the deficits listed in the problem list box on initial evaluation, provide pt/family education and to maximize pt's level of independence in the home and community environment.     Pt's spiritual, cultural and educational needs considered and pt agreeable to plan of care and goals.     Anticipated barriers to physical therapy: Hypermobility of the Shoulder     Goals:  Short Term Goals (4 Weeks):   1. Pt will  be independent with HEP to supplement PT in improving functional use of R UE.  2. Pt will increase pain free shoulder elevation AROM to >/= 160 deg to improve functional mobility of UE  3. Pt will increase shoulder ER PROM in 0 deg abduction to >/=75 deg to improve functional mobility of UE  4. Pt will improve shoulder MMTs by 1/2 grade to promote equal use of B UEs in performing functional tasks.  Long Term Goals (8 Weeks):   1. Pt will improve FOTO score to </=TBD% limited to decrease perceived limitation with carrying, moving, and handling objects.  2. Pt will increase shoulder AROM to WFL in all planes to improve functional use of R RUE.  3. Pt will improve shoulder MMTs by 1 grade to promote equal use of B UEs in performing functional tasks.  5. Pt will lift 10 lb objects without pain to promote functional QOL.  6. Pt to report pain </= 0/10 with ADLs and IADLs using UE to improve functional QOL.   Plan      Plan of care Certification: 12/9/2024 to 2/9/2024.     Outpatient Physical Therapy 2 times weekly for 8 weeks to include the following interventions: Cervical/Lumbar Traction, Electrical Stimulation , Gait Training, Manual Therapy, Moist Heat/ Ice, Neuromuscular Re-ed, Orthotic Management and Training, Patient Education, Self Care, Therapeutic Activities, and Therapeutic Exercise.        Raoul Brewer PT, DPT

## 2025-05-05 NOTE — PROGRESS NOTES
Primary Care Behavioral Health Integration: Follow-up  Date:  5/6/2025  Patient Name: Ermelinda Trivedi  Type of Visit:  Video Session  Site: [facility]  Referral Source:  Chadd Lynn MD    Visit type: Virtual visit with synchronous audio and video  The patient location is:  Home [address] [place] [address] [place] [address]  The patient location [place] is: [facility]  The patient phone number is: [phone]    Each patient to whom he or she provides medical services by telemedicine is:  (1) informed of the relationship between the physician and patient and the respective role of any other health care provider with respect to management of the patient; and (2) notified that he or she may decline to receive medical services by telemedicine and may withdraw from such care at any time.    History of Present Illness:  Ermelinda Trivedi, a 34 y.o.  female with history of Anxiety disorders; generalized anxiety disorder [F41.1] referred by Chadd Lynn MD.  Patient was seen, examined and chart was reviewed.    Met with patient.     Current session:   Pt discussed recent successes managing anxiety. Pt reported practicing mindfulness and using cognitive restructuring techniques has helped. Pt's primary treatment goal at this time is becoming more comfortable leaving her house. LCSW provided information and rationale for exposure therapy to help reduce anxiety symptoms. Pt identified current safety behaviors when leaving her house, including bringing her boyfriend, talking to her boyfriend on the phone, and turning around. LCSW provided info on the STEPHANIE scale and pt identified goals for different numbers on the scale. Pt identified a goal to go to the grocery store this week without using any safety behaviors. Pt also has a goal to attend her next therapy session in person.         5/5/2025     3:04 PM 4/21/2025     3:00 PM 4/9/2025     3:02 PM 3/20/2025    12:56 PM   Results of the PHQ8   Little interest or  pleasure in doing things Not at all Not at all Not at all Several days   Feeling down, depressed, or hopeless Not at all Not at all Several days Several days   Trouble falling or staying asleep, or sleeping too much Several days More than half the days Nearly every day Several days   Feeling tired or having little energy Several days More than half the days Nearly every day Nearly every day   Poor appetite or overeating Not at all Not at all Not at all Not at all   Feeling bad about yourself - or that you are a failure or have let yourself or your family down Several days Not at all Several days Not at all   Trouble concentrating on things, such as reading the newspaper or watching television Several days Not at all Not at all Several days   Moving or speaking so slowly that other people could have noticed. Or the opposite - being so fidgety or restless that you have been moving around a lot more than usual Several days More than half the days Several days Several days   Total Score  5 6 9 8           5/5/2025     3:03 PM 4/21/2025     3:00 PM 4/9/2025     3:01 PM   GAD7   1. Feeling nervous, anxious, or on edge? 3 2 3   2. Not being able to stop or control worrying? 2 2 3   3. Worrying too much about different things? 2 3 3   4. Trouble relaxing? 1 3 3   5. Being so restless that it is hard to sit still? 0 2 3   6. Becoming easily annoyed or irritable? 1 2 3   7. Feeling afraid as if something awful might happen? 3 3 3   8. If you checked off any problems, how difficult have these problems made it for you to do your work, take care of things at home, or get along with other people? 1 1 1   EUGENE-7 Score 12  17  21        Patient-reported       Mental Status Exam  General Appearance:  unremarkable, age appropriate     Speech: normal tone, normal rate, normal pitch, normal volume         Level of Cooperation: cooperative        Thought Processes: normal and logical      Mood: steady        Thought Content: normal, no  suicidality, no homicidality, delusions, or paranoia     Affect: congruent and appropriate    Orientation: Oriented x3     Memory: recent >  intact, remote >  intact     Attention Span & Concentration: intact     Fund of General Knowledge: intact and appropriate to age and level of education   Abstract Reasoning: interpretation of similarities was abstract   Judgment & Insight: good       Language:  intact       Treatment plan:  Target symptoms: anxiety   Why chosen therapy is appropriate versus another modality: relevant to diagnosis, patient responds to this modality, evidence based practice  Outcome monitoring methods: self-report, checklist/rating scale  Therapeutic intervention type: insight oriented psychotherapy, behavior modifying psychotherapy, supportive psychotherapy    Risk parameters:  Patient reports no suicidal ideation  Patient reports no homicidal ideation  Patient reports no self-injurious behavior  Patient reports no violent behavior    Verbal deficits: None    Patient's response to intervention:  The patient's response to intervention is accepting.    Progress toward goals and other mental status changes:  The patient's progress toward goals is good.    Patient advised to call [phone] or present the the nearest ED if they experience suicidal or homicidal ideation, plan or intent.       Impression:  Pt presented to [facility] program with chief complaint of anxiety. Current anxiety symptoms include: increased irritability, racing thoughts, muscle tension, excessive worrying, restlessness, difficulty sleeping and panic attacks. Pt has difficulty leaving the house and feeling out of control. Pt is responding well to CBT and mindfulness based interventions.     Diagnosis:   1. Generalized anxiety disorder            Treatment Goals and Plan: Pt plans to continue individual therapy. Pt plans to practice mindfulness and utilize CBT worksheets.    Future treatment will utilize CBT, Mindfulness Techniques,  and Solution-focused Therapy    Return to Clinic: 2 weeks      Length of Appointment: 60 Non face-to-face clinical time: 15

## 2025-05-06 ENCOUNTER — CLINICAL SUPPORT (OUTPATIENT)
Dept: REHABILITATION | Facility: HOSPITAL | Age: 35
End: 2025-05-06
Payer: COMMERCIAL

## 2025-05-06 DIAGNOSIS — M89.9 SCAPULAR DYSFUNCTION: ICD-10-CM

## 2025-05-06 DIAGNOSIS — M25.612 DECREASED RANGE OF MOTION OF LEFT SHOULDER: Primary | ICD-10-CM

## 2025-05-06 DIAGNOSIS — R29.898 WEAKNESS OF LEFT SHOULDER: ICD-10-CM

## 2025-05-06 DIAGNOSIS — M53.84 DECREASED RANGE OF MOTION OF THORACIC SPINE: ICD-10-CM

## 2025-05-06 PROCEDURE — 97110 THERAPEUTIC EXERCISES: CPT

## 2025-05-20 NOTE — PROGRESS NOTES
Ear, Nose, & Throat  Otolaryngology - Head & Neck Surgery    Summary of Visit:  Ermelinda Trivedi was referred to me by Dr. Chadd Lynn in consultation for pulsatile tinnitus    Subjective:     Chief Complaint: No chief complaint on file.      Ermelinda Trivedi is a 34 y.o. female who was referred to me by Dr. Chadd Lynn in consultation for pulsatile tinnitus.  For several months, she has noted left-sided pulsatile tinnitus.  This is in time with her heartbeat.  She notes minimal subjective hearing loss.  She has no otalgia or otorrhea.  She denies any vertigo as well.  She does not notice any obvious exacerbating or relieving factors.  She is not experiencing of the tinnitus while in the office today.    Past Medical History  Active Ambulatory Problems     Diagnosis Date Noted    Anxiety 11/12/2024    Decreased range of motion of left shoulder 12/10/2024    Weakness of left shoulder 12/10/2024    Scapular dysfunction 12/10/2024    Decreased range of motion of thoracic spine 12/10/2024    Vitamin D deficiency 12/13/2024    Heart murmur 12/13/2024     Resolved Ambulatory Problems     Diagnosis Date Noted    Viral URI with cough 05/19/2022     No Additional Past Medical History       Past Surgical History  She has no past surgical history on file.    No past surgical history on file.     Family History  Her family history includes Hypertension in her father and mother.    Social History  She reports that she has never smoked. She has never used smokeless tobacco. She reports that she does not drink alcohol and does not use drugs.    Allergies  She has no known allergies.    Medications  She has a current medication list which includes the following prescription(s): ergocalciferol and tretinoin.    ROS:  Pertinent positive and negative review of systems as noted in HPI.     Objective:     There were no vitals taken for this visit.   General Appearance:   Awake, Alert and Oriented. NAD. Appropriate affect and appearance       Neuro:   Spontaneous eye opening, appropriate verbal responses, follows commands  Pupils equal, round & brisk. EOMI, no proptosis  Face is symmetric, HB I, non-edematous bilaterally  Vision grossly intact, Hearing grossly intact     Head and Face:   skin is intact with no lesions noted.  Parotid and submandibular glands are symmetric and non-tender.      Ears:  Periauricular regions non-erythematous, non-fluctuanct non-tender  Pinna normal bilaterally, no skin lesions  EACs patent and without drainage bilaterally   Tympanic membranes are normal in appearance bilaterally.  No middle ear effusion noted bilaterally.    Nose:   External nose is symmetric, no skin lesions  Septum midline, No inferior turbinate hypertrophy, No polyps or rhinorrhea     OC/OP:  Tongue midline on extension, non-edematous, soft  No labial, buccal, oral tongue or floor of mouth lesions  Soft palate symmetric, midline and without lesions or masses, tonsils symmetric  No masses or lesions of the visualized oropharynx     Neck:  Neck is symmetric, non-edematous, non-erythematous  Trachea is midline and easily palpable,  No palpable adenopathy or masses in levels I-VI  No thyroid nodules or masses, non-tender      Respiratory:  Normal work of breathing, no accessory muscle use, no stridor     Voice:  Normal vocal quality, volume and articulation    Data Review:   LABS    IMAGING        AUDIO      Procedures:       Assessment:     1. Pulsatile tinnitus, unspecified ear        Plan:     I had a long discussion with the patient regarding her condition and the further workup and management options.  Unfortunately, she is not experiencing the tinnitus in the office today thus we can not assess with Furstenberg test etc..  Audiogram demonstrates low-frequency sensorineural hearing loss in the left ear.  CT scan of the temporal bones is ordered to assess for possible etiology including superior semicircular canal dehiscence, sigmoid sinus  dehiscence, etc..  Follow up once this is performed    Orders Placed This Encounter   Procedures    CT Temporal Bone without contrast          Problem List Items Addressed This Visit    None  Visit Diagnoses         Pulsatile tinnitus, unspecified ear    -  Primary    Relevant Orders    CT Temporal Bone without contrast (Completed)

## 2025-05-21 ENCOUNTER — PATIENT MESSAGE (OUTPATIENT)
Dept: BEHAVIORAL HEALTH | Facility: CLINIC | Age: 35
End: 2025-05-21
Payer: COMMERCIAL

## 2025-05-21 ENCOUNTER — TELEPHONE (OUTPATIENT)
Dept: BEHAVIORAL HEALTH | Facility: CLINIC | Age: 35
End: 2025-05-21
Payer: COMMERCIAL

## 2025-05-23 ENCOUNTER — PATIENT MESSAGE (OUTPATIENT)
Dept: REHABILITATION | Facility: HOSPITAL | Age: 35
End: 2025-05-23
Payer: COMMERCIAL

## 2025-05-28 ENCOUNTER — PATIENT MESSAGE (OUTPATIENT)
Dept: BEHAVIORAL HEALTH | Facility: CLINIC | Age: 35
End: 2025-05-28
Payer: COMMERCIAL

## 2025-05-29 ENCOUNTER — PATIENT MESSAGE (OUTPATIENT)
Dept: BEHAVIORAL HEALTH | Facility: CLINIC | Age: 35
End: 2025-05-29
Payer: COMMERCIAL

## 2025-06-02 ENCOUNTER — CLINICAL SUPPORT (OUTPATIENT)
Dept: REHABILITATION | Facility: HOSPITAL | Age: 35
End: 2025-06-02
Payer: MEDICAID

## 2025-06-02 DIAGNOSIS — M25.612 DECREASED RANGE OF MOTION OF LEFT SHOULDER: Primary | ICD-10-CM

## 2025-06-02 DIAGNOSIS — R29.898 WEAKNESS OF LEFT SHOULDER: ICD-10-CM

## 2025-06-02 DIAGNOSIS — M89.9 SCAPULAR DYSFUNCTION: ICD-10-CM

## 2025-06-02 DIAGNOSIS — M53.84 DECREASED RANGE OF MOTION OF THORACIC SPINE: ICD-10-CM

## 2025-06-02 PROCEDURE — 97110 THERAPEUTIC EXERCISES: CPT

## 2025-06-12 ENCOUNTER — OFFICE VISIT (OUTPATIENT)
Dept: BEHAVIORAL HEALTH | Facility: CLINIC | Age: 35
End: 2025-06-12
Payer: COMMERCIAL

## 2025-06-12 ENCOUNTER — TELEPHONE (OUTPATIENT)
Dept: BEHAVIORAL HEALTH | Facility: CLINIC | Age: 35
End: 2025-06-12
Payer: COMMERCIAL

## 2025-06-12 DIAGNOSIS — F41.1 GENERALIZED ANXIETY DISORDER: Primary | ICD-10-CM

## 2025-06-12 NOTE — PROGRESS NOTES
"Primary Care Behavioral Health Integration: Follow-up  Date:  6/13/2025  Patient Name: Ermelinda Trivedi  Type of Visit:  Video Session  Site: [facility]  Referral Source:  Chadd Lynn MD    Visit type: Virtual visit with synchronous audio and video  The patient location is:  Home [address] [place] [address] [place] [address]  The patient location [place] is: [facility]  The patient phone number is: [phone]    Each patient to whom he or she provides medical services by telemedicine is:  (1) informed of the relationship between the physician and patient and the respective role of any other health care provider with respect to management of the patient; and (2) notified that he or she may decline to receive medical services by telemedicine and may withdraw from such care at any time.    History of Present Illness:  Ermelinda Trivedi, a 34 y.o.  female with history of Anxiety disorders; generalized anxiety disorder [F41.1] referred by Chadd Lynn MD.  Patient was seen, examined and chart was reviewed.    Met with patient.     Current session:   Pt discussed recent work related anxiety. 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 Pt believes her anxiety was also impacting her work performance. Pt reported that she and her supervisor made a mutual decision for pt to resign from the position. Pt reported she has been working to not engage in catastrophizing thoughts and stated that this experience showed her that "the worst happened" and she was still ok. Pt also expressed pride in her ability to drive to the [facility] for this job, which is something that pt would not have done otherwise. Pt reported she's learned that she is capable of more than her anxiety sometimes tells her that she is.     Pt has been practicing riding out her anxiety. Pt reported she recently went to go shopping and began to experience intense anxiety when in the " "parking lot. Pt reported she usually would have turned around and gone home but this time she told herself "I can do this" and waited until the peak of the anxiety had passed. Pt reported she was able to go in the store and ended up spending an hour shopping and enjoyed herself. LCSW provided support and encouragement and reviewed information about the cycle of anxiety and benefits of exposure. Pt has a goal to continue leaving her apartment more frequently.         6/12/2025     3:08 PM 5/29/2025     1:21 PM 5/5/2025     3:04 PM 4/21/2025     3:00 PM 4/9/2025     3:02 PM 3/20/2025    12:56 PM   Results of the PHQ8   Little interest or pleasure in doing things Not at all Not at all Not at all Not at all Not at all Several days   Feeling down, depressed, or hopeless Not at all Not at all Not at all Not at all Several days Several days   Trouble falling or staying asleep, or sleeping too much Several days Several days Several days More than half the days Nearly every day Several days   Feeling tired or having little energy Several days Several days Several days More than half the days Nearly every day Nearly every day   Poor appetite or overeating Not at all Not at all Not at all Not at all Not at all Not at all   Feeling bad about yourself - or that you are a failure or have let yourself or your family down Not at all Several days Several days Not at all Several days Not at all   Trouble concentrating on things, such as reading the newspaper or watching television Not at all Not at all Several days Not at all Not at all Several days   Moving or speaking so slowly that other people could have noticed. Or the opposite - being so fidgety or restless that you have been moving around a lot more than usual Not at all Several days Several days More than half the days Several days Several days   Total Score  2 4 5 6 9 8           6/12/2025     3:07 PM 5/29/2025     1:22 PM 5/5/2025     3:03 PM   GAD7   1. Feeling nervous, " anxious, or on edge? 1 1 3   2. Not being able to stop or control worrying? 1 1 2   3. Worrying too much about different things? 1 1 2   4. Trouble relaxing? 1 0 1   5. Being so restless that it is hard to sit still? 1 0 0   6. Becoming easily annoyed or irritable? 1 1 1   7. Feeling afraid as if something awful might happen? 1 3 3   8. If you checked off any problems, how difficult have these problems made it for you to do your work, take care of things at home, or get along with other people? 1 1 1   EUGENE-7 Score 7  7  12        Patient-reported       Mental Status Exam  General Appearance:  unremarkable, age appropriate     Speech: normal tone, normal rate, normal pitch, normal volume         Level of Cooperation: cooperative        Thought Processes: normal and logical      Mood: steady        Thought Content: normal, no suicidality, no homicidality, delusions, or paranoia     Affect: congruent and appropriate    Orientation: Oriented x3     Memory: recent >  intact, remote >  intact     Attention Span & Concentration: intact     Fund of General Knowledge: intact and appropriate to age and level of education   Abstract Reasoning: interpretation of similarities was abstract   Judgment & Insight: good       Language:  intact       Treatment plan:  Target symptoms: anxiety   Why chosen therapy is appropriate versus another modality: relevant to diagnosis, patient responds to this modality, evidence based practice  Outcome monitoring methods: self-report, checklist/rating scale  Therapeutic intervention type: insight oriented psychotherapy, behavior modifying psychotherapy, supportive psychotherapy    Risk parameters:  Patient reports no suicidal ideation  Patient reports no homicidal ideation  Patient reports no self-injurious behavior  Patient reports no violent behavior    Verbal deficits: None    Patient's response to intervention:  The patient's response to intervention is accepting.    Progress toward goals and  other mental status changes:  The patient's progress toward goals is good.    Patient advised to call [phone] or present the the nearest ED if they experience suicidal or homicidal ideation, plan or intent.       Impression:  Pt presented to [facility] program with chief complaint of anxiety. Current anxiety symptoms include: increased irritability, racing thoughts, muscle tension, excessive worrying, restlessness, difficulty sleeping and panic attacks. Pt has difficulty leaving the house and feeling out of control. Pt is responding well to CBT and mindfulness based interventions.     Diagnosis:   1. Generalized anxiety disorder            Treatment Goals and Plan: Pt plans to continue individual therapy. Pt plans to practice mindfulness and utilize CBT worksheets.    Future treatment will utilize CBT, Mindfulness Techniques, and Solution-focused Therapy    Return to Clinic: 2 weeks      Length of Appointment: 60 Non face-to-face clinical time: 15

## 2025-06-24 ENCOUNTER — OFFICE VISIT (OUTPATIENT)
Dept: BEHAVIORAL HEALTH | Facility: CLINIC | Age: 35
End: 2025-06-24
Payer: COMMERCIAL

## 2025-06-24 ENCOUNTER — PATIENT MESSAGE (OUTPATIENT)
Dept: BEHAVIORAL HEALTH | Facility: CLINIC | Age: 35
End: 2025-06-24
Payer: MEDICAID

## 2025-06-24 DIAGNOSIS — F41.1 GENERALIZED ANXIETY DISORDER: Primary | ICD-10-CM

## 2025-06-24 PROCEDURE — 90837 PSYTX W PT 60 MINUTES: CPT | Mod: 95,,,

## 2025-06-24 NOTE — Clinical Note
Hi Dr. William,  We've discussed this pt before, she was initially resistant to medication but is now interested in discussing options. She has a lot of anxiety about starting medication for anxiety and would like to meet with you to hear directly from you what your recommendations are. Would this be possible? Thanks!

## 2025-06-24 NOTE — PROGRESS NOTES
"Primary Care Behavioral Health Integration: Follow-up  Date:  6/25/2025  Patient Name: Ermelinda Trivedi  Type of Visit:  Video Session  Site: [facility]  Referral Source:  Chadd Lynn MD    Visit type: Virtual visit with synchronous audio and video  The patient location is:  Home [address] [place] [address] [place] [address]  The patient location [place] is: [facility]  The patient phone number is: [phone]    Each patient to whom he or she provides medical services by telemedicine is:  (1) informed of the relationship between the physician and patient and the respective role of any other health care provider with respect to management of the patient; and (2) notified that he or she may decline to receive medical services by telemedicine and may withdraw from such care at any time.    History of Present Illness:  Ermelinda Trivedi, a 34 y.o.  female with history of Anxiety disorders; generalized anxiety disorder [F41.1] referred by Chadd Lynn MD.  Patient was seen, examined and chart was reviewed.    Met with patient.     Current session:   Pt reported she has been successful in leaving her apartment more often, which she noticed has helped reduce anxiety about being out in public. 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 Pt believes school is what caused her anxiety symptoms to initially manifest and pt doesn't have confidence in her ability to get through the rest of her program. Pt reported she has a general fear of failure in regards to NP school, which has been a dream of hers for a long time. Pt reported a desire to complete school but stated she doesn't know if she "has it in her" to finish. Pt also specifically worries about fainting in class. LCSW worked with pt to analyze and reframe pt's fear of fainting in class, and discussed what the worst is that could happen and how pt could " cope. Fear of failure will be further explored next session. LCSW discussed with pt again exploring medication options to help pt with her anxiety levels. Pt expressed interest in meeting with Dr. William for a medication consultation.         6/24/2025     2:18 PM 6/12/2025     3:08 PM 5/29/2025     1:21 PM 5/5/2025     3:04 PM 4/21/2025     3:00 PM 4/9/2025     3:02 PM 3/20/2025    12:56 PM   Results of the PHQ8   Little interest or pleasure in doing things Not at all Not at all Not at all Not at all Not at all Not at all Several days   Feeling down, depressed, or hopeless Several days Not at all Not at all Not at all Not at all Several days Several days   Trouble falling or staying asleep, or sleeping too much Several days Several days Several days Several days More than half the days Nearly every day Several days   Feeling tired or having little energy Several days Several days Several days Several days More than half the days Nearly every day Nearly every day   Poor appetite or overeating Not at all Not at all Not at all Not at all Not at all Not at all Not at all   Feeling bad about yourself - or that you are a failure or have let yourself or your family down Several days Not at all Several days Several days Not at all Several days Not at all   Trouble concentrating on things, such as reading the newspaper or watching television Not at all Not at all Not at all Several days Not at all Not at all Several days   Moving or speaking so slowly that other people could have noticed. Or the opposite - being so fidgety or restless that you have been moving around a lot more than usual Several days Not at all Several days Several days More than half the days Several days Several days   Total Score  5 2 4 5 6 9 8           6/24/2025     2:18 PM 6/12/2025     3:07 PM 5/29/2025     1:22 PM   GAD7   1. Feeling nervous, anxious, or on edge? 2 1 1   2. Not being able to stop or control worrying? 3 1 1   3. Worrying too much  about different things? 2 1 1   4. Trouble relaxing? 1 1 0   5. Being so restless that it is hard to sit still? 0 1 0   6. Becoming easily annoyed or irritable? 1 1 1   7. Feeling afraid as if something awful might happen? 2 1 3   8. If you checked off any problems, how difficult have these problems made it for you to do your work, take care of things at home, or get along with other people? 1 1 1   EUGENE-7 Score 11  7  7        Patient-reported       Mental Status Exam  General Appearance:  unremarkable, age appropriate     Speech: normal tone, normal rate, normal pitch, normal volume         Level of Cooperation: cooperative        Thought Processes: normal and logical      Mood: steady        Thought Content: normal, no suicidality, no homicidality, delusions, or paranoia     Affect: congruent and appropriate    Orientation: Oriented x3     Memory: recent >  intact, remote >  intact     Attention Span & Concentration: intact     Fund of General Knowledge: intact and appropriate to age and level of education   Abstract Reasoning: interpretation of similarities was abstract   Judgment & Insight: good       Language:  intact       Treatment plan:  Target symptoms: anxiety   Why chosen therapy is appropriate versus another modality: relevant to diagnosis, patient responds to this modality, evidence based practice  Outcome monitoring methods: self-report, checklist/rating scale  Therapeutic intervention type: insight oriented psychotherapy, behavior modifying psychotherapy, supportive psychotherapy    Risk parameters:  Patient reports no suicidal ideation  Patient reports no homicidal ideation  Patient reports no self-injurious behavior  Patient reports no violent behavior    Verbal deficits: None    Patient's response to intervention:  The patient's response to intervention is accepting.    Progress toward goals and other mental status changes:  The patient's progress toward goals is good.    Patient advised to call  [phone] or present the the nearest ED if they experience suicidal or homicidal ideation, plan or intent.       Impression:  Pt presented to [facility] program with chief complaint of anxiety. Current anxiety symptoms include: increased irritability, racing thoughts, muscle tension, excessive worrying, restlessness, difficulty sleeping and panic attacks. Pt has difficulty leaving the house and feeling out of control. Pt is responding well to CBT and mindfulness based interventions.     Diagnosis:   1. Generalized anxiety disorder            Treatment Goals and Plan: Pt plans to continue individual therapy. Pt plans to practice mindfulness and utilize CBT worksheets.    Future treatment will utilize CBT, Mindfulness Techniques, and Solution-focused Therapy    Return to Clinic: 2 weeks      Length of Appointment: 60 Non face-to-face clinical time: 15

## 2025-06-25 ENCOUNTER — PATIENT MESSAGE (OUTPATIENT)
Dept: INTERNAL MEDICINE | Facility: CLINIC | Age: 35
End: 2025-06-25
Payer: COMMERCIAL

## 2025-06-25 ENCOUNTER — PATIENT MESSAGE (OUTPATIENT)
Dept: OTOLARYNGOLOGY | Facility: CLINIC | Age: 35
End: 2025-06-25
Payer: MEDICAID

## 2025-06-25 ENCOUNTER — PATIENT MESSAGE (OUTPATIENT)
Dept: REHABILITATION | Facility: HOSPITAL | Age: 35
End: 2025-06-25
Payer: MEDICAID

## 2025-06-26 ENCOUNTER — PATIENT MESSAGE (OUTPATIENT)
Dept: INTERNAL MEDICINE | Facility: CLINIC | Age: 35
End: 2025-06-26
Payer: MEDICAID

## 2025-06-26 DIAGNOSIS — E55.9 VITAMIN D DEFICIENCY: ICD-10-CM

## 2025-06-26 DIAGNOSIS — R53.83 OCCASIONAL FATIGUE: Primary | ICD-10-CM

## 2025-06-26 NOTE — TELEPHONE ENCOUNTER
Pt would like to know if provider will complete attached accommodations form, scheduled VV    LOV with Chadd Lynn MD , 3/11/2025 for problems with L ear

## 2025-06-26 NOTE — TELEPHONE ENCOUNTER
" LOV with Chadd Lynn MD , 3/11/2025  Pt is scheduled for a virtual with you on Monday 6/30/25    Last vit D was performed on 1/29/25 and was 33. Your result note mentioned "It's in the normal range, though at the low end of normal. If you have remaining doses of the high dose supplement I prescribed, go ahead and finish that out. Once those are gone,  an over-the-counter daily vitamin D supplement with a dose range of 1,000 to 5,000 IU. This should keep you in a healthy range. We'll recheck this with your next labs to see how you're doing. "    Pt is requesting lab orders for this now and also order for b12(states she takes otc formulation for fatigue). I have pended both orders for your review if you are ok with ordering at this time  "

## 2025-07-02 ENCOUNTER — TELEPHONE (OUTPATIENT)
Dept: BEHAVIORAL HEALTH | Facility: CLINIC | Age: 35
End: 2025-07-02
Payer: MEDICAID

## 2025-07-02 ENCOUNTER — PATIENT MESSAGE (OUTPATIENT)
Dept: BEHAVIORAL HEALTH | Facility: CLINIC | Age: 35
End: 2025-07-02
Payer: MEDICAID

## 2025-07-08 ENCOUNTER — PATIENT MESSAGE (OUTPATIENT)
Dept: INTERNAL MEDICINE | Facility: CLINIC | Age: 35
End: 2025-07-08
Payer: MEDICAID

## 2025-07-14 PROCEDURE — 93005 ELECTROCARDIOGRAM TRACING: CPT

## 2025-07-14 PROCEDURE — 93010 ELECTROCARDIOGRAM REPORT: CPT | Mod: ,,, | Performed by: INTERNAL MEDICINE

## 2025-07-14 PROCEDURE — 99283 EMERGENCY DEPT VISIT LOW MDM: CPT | Mod: 25

## 2025-07-15 ENCOUNTER — HOSPITAL ENCOUNTER (EMERGENCY)
Facility: HOSPITAL | Age: 35
Discharge: HOME | End: 2025-07-15
Attending: EMERGENCY MEDICINE
Payer: COMMERCIAL

## 2025-07-15 VITALS
HEART RATE: 98 BPM | HEIGHT: 60 IN | SYSTOLIC BLOOD PRESSURE: 115 MMHG | BODY MASS INDEX: 30.82 KG/M2 | DIASTOLIC BLOOD PRESSURE: 65 MMHG | WEIGHT: 157 LBS | OXYGEN SATURATION: 99 % | TEMPERATURE: 99 F | RESPIRATION RATE: 15 BRPM

## 2025-07-15 DIAGNOSIS — U07.1 COVID: ICD-10-CM

## 2025-07-15 DIAGNOSIS — J06.9 UPPER RESPIRATORY TRACT INFECTION, UNSPECIFIED TYPE: Primary | ICD-10-CM

## 2025-07-15 DIAGNOSIS — R00.0 TACHYCARDIA: ICD-10-CM

## 2025-07-15 LAB
INFLUENZA A MOLECULAR (OHS): NEGATIVE
INFLUENZA B MOLECULAR (OHS): NEGATIVE
OHS QRS DURATION: 76 MS
OHS QTC CALCULATION: 573 MS
SARS-COV-2 RDRP RESP QL NAA+PROBE: POSITIVE

## 2025-07-15 PROCEDURE — 25000003 PHARM REV CODE 250: Performed by: STUDENT IN AN ORGANIZED HEALTH CARE EDUCATION/TRAINING PROGRAM

## 2025-07-15 PROCEDURE — 87502 INFLUENZA DNA AMP PROBE: CPT | Performed by: STUDENT IN AN ORGANIZED HEALTH CARE EDUCATION/TRAINING PROGRAM

## 2025-07-15 PROCEDURE — U0002 COVID-19 LAB TEST NON-CDC: HCPCS | Performed by: STUDENT IN AN ORGANIZED HEALTH CARE EDUCATION/TRAINING PROGRAM

## 2025-07-15 RX ORDER — ACETAMINOPHEN 500 MG
1000 TABLET ORAL
Status: COMPLETED | OUTPATIENT
Start: 2025-07-15 | End: 2025-07-15

## 2025-07-15 RX ADMIN — ACETAMINOPHEN 1000 MG: 500 TABLET ORAL at 12:07

## 2025-07-15 NOTE — ED PROVIDER NOTES
Encounter Date: 7/14/2025       History     Chief Complaint   Patient presents with    Fatigue     Generalized weakness, palpitations, dehydration, fever, chills.      HPI    Ermelinda Trivedi is a 34 YOF with past medical history significant for anxiety and heart murmurs presenting with fatigue.    Patient states that starting yesterday she started feeling cough, congestion, generalized fatigue.  Also noted palpitations.  States that she frequently experiences palpitations when she has a viral illness.  States that she was seen at urgent care and was advised to orally hydrate at home.  Advised to presents to the emergency department if her symptoms did not improve.  She continues to endorse palpitations.  No chest pain or difficulty breathing.  No other acute concerns at this time.  Review of patient's allergies indicates:  No Known Allergies  History reviewed. No pertinent past medical history.  History reviewed. No pertinent surgical history.  Family History   Problem Relation Name Age of Onset    Hypertension Mother      Hypertension Father       Social History[1]  Review of Systems    Physical Exam     Initial Vitals [07/14/25 2335]   BP Pulse Resp Temp SpO2   (!) 158/78 (!) 138 18 99.6 °F (37.6 °C) 95 %      MAP       --         Physical Exam    Nursing note and vitals reviewed.  Constitutional: She appears well-developed and well-nourished.   HENT:   Head: Normocephalic and atraumatic.   Cardiovascular:  Normal rate, regular rhythm, normal heart sounds and intact distal pulses.           No murmur heard.  Pulmonary/Chest: Breath sounds normal. No respiratory distress. She has no wheezes. She has no rhonchi. She has no rales.   Abdominal: Abdomen is soft. Bowel sounds are normal. She exhibits no distension. There is no abdominal tenderness.   Musculoskeletal:         General: Normal range of motion.     Neurological: She is alert.   Skin: Skin is warm. Capillary refill takes less than 2 seconds.   Psychiatric:  She has a normal mood and affect.         ED Course   Procedures  Labs Reviewed   SARS-COV-2 RNA AMPLIFICATION, QUAL - Abnormal       Result Value    SARS COV-2 Molecular Positive (*)    INFLUENZA A & B BY MOLECULAR - Normal    INFLUENZA A MOLECULAR Negative      INFLUENZA B MOLECULAR  Negative            Imaging Results    None          Medications   lactated ringers bolus 1,000 mL (1,000 mLs Intravenous Bolus from Bag 7/15/25 0052)   acetaminophen tablet 1,000 mg (1,000 mg Oral Given 7/15/25 0050)     Medical Decision Making  Ermelinda Trivedi is a 34 YOF with past medical history significant for anxiety and heart murmurs presenting with fatigue.  On arrival patient is tachycardic with heart rate of 138.  She is otherwise hemodynamically stable.  Exam grossly unremarkable.  Differentials at this time include viral illness, COVID, flu, dehydration.  Plan to give IV bolus and swab for COVID and flu.  Patient found to be COVID positive.  Heart rate improves with IV bolus.  At this time recommend discharge home with supportive cares and contact precautions.  Given work note.  Encouraged oral hydration in the best as she can and to follow up with your primary care doctor.  Return precautions provided.    Amount and/or Complexity of Data Reviewed  Labs:  Decision-making details documented in ED Course.    Risk  OTC drugs.               ED Course as of 07/15/25 0252   Tue Jul 15, 2025   0020 Pulse(!): 138 [AC]   0020 Temp: 99.6 °F (37.6 °C) [AC]   0156 Pulse: 107  Tachycardia improving [AC]   0213 SARS COV-2 MOLECULAR(!): Positive [AC]      ED Course User Index  [AC] Wendy Lloyd MD                               Clinical Impression:  Final diagnoses:  [R00.0] Tachycardia  [J06.9] Upper respiratory tract infection, unspecified type (Primary)  [U07.1] COVID          ED Disposition Condition    Discharge Stable          ED Prescriptions    None       Follow-up Information       Follow up With Specialties Details Why  Contact Info    Chadd Lynn MD Internal Medicine In 1 week  [address]  [facility] [address]  [phone]      [facility] - Emergency Dept Emergency Medicine  As needed [address]  [facility] [address] [phone]                   [1]   Social History  Tobacco Use    Smoking status: Never    Smokeless tobacco: Never   Substance Use Topics    Alcohol use: Never    Drug use: Never        Wendy Lloyd MD  Resident  07/15/25 0252

## 2025-07-15 NOTE — Clinical Note
"Ermelinda Frankjohn Trivedi was seen and treated in our emergency department on 7/14/2025.  She may return to work on 07/18/2025.       If you have any questions or concerns, please don't hesitate to call.      Wendy Lloyd MD"

## 2025-07-15 NOTE — DISCHARGE INSTRUCTIONS
You were seen in the emergency department for palpitations.  You were given IV fluids and your heart rate improved.  Based on your symptoms I suspect this was provoked by a viral illness.  Recommend that you discharge home and do your best to stay hydrated.  Drink water and things like Gatorade, broth, or Pedialyte.  Please follow up with the primary care doctor for further management.    Please return to the emergency department if you develop chest pain, difficulty breathing, loss of consciousness, uncontrolled vomiting, or any new concern.

## 2025-07-15 NOTE — ED TRIAGE NOTES
Ermelinda Trivedi, a 34 y.o. female presents to the ED w/ complaint of fatigue. Pt reporting generalized fatigue, body aches, fever, chills and palpitations. States she feels dehydrated. Pt denies CP or SOB.

## 2025-07-17 ENCOUNTER — PATIENT MESSAGE (OUTPATIENT)
Dept: BEHAVIORAL HEALTH | Facility: CLINIC | Age: 35
End: 2025-07-17
Payer: MEDICAID

## 2025-07-17 ENCOUNTER — TELEPHONE (OUTPATIENT)
Dept: BEHAVIORAL HEALTH | Facility: CLINIC | Age: 35
End: 2025-07-17
Payer: MEDICAID

## 2025-07-17 ENCOUNTER — PATIENT MESSAGE (OUTPATIENT)
Dept: INTERNAL MEDICINE | Facility: CLINIC | Age: 35
End: 2025-07-17
Payer: MEDICAID

## 2025-07-22 ENCOUNTER — PATIENT MESSAGE (OUTPATIENT)
Dept: BEHAVIORAL HEALTH | Facility: CLINIC | Age: 35
End: 2025-07-22
Payer: COMMERCIAL

## 2025-07-23 ENCOUNTER — OFFICE VISIT (OUTPATIENT)
Dept: BEHAVIORAL HEALTH | Facility: CLINIC | Age: 35
End: 2025-07-23
Payer: MEDICAID

## 2025-07-23 DIAGNOSIS — F41.1 GENERALIZED ANXIETY DISORDER: Primary | ICD-10-CM

## 2025-07-23 NOTE — PROGRESS NOTES
"Primary Care Behavioral Health Integration: Follow-up  Date:  7/25/2025  Patient Name: Ermelinda Trivedi  Type of Visit:  In person  Site: [facility]  Referral Source:  Chadd Lynn MD      History of Present Illness:  Ermelinda Trivedi, a 34 y.o.  female with history of Anxiety disorders; generalized anxiety disorder [F41.1] referred by Chadd Lynn MD.  Patient was seen, examined and chart was reviewed.    Met with patient.     Current session:   Pt reported her anxiety has been lower recently. Pt went on a road trip, which took her out of her normal routine and provided a helpful distraction. Pt has also been working to leave her house more often. 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 Pt reported she recently started attending [employer], and found she felt anxious and "overstimulated" in similar ways as she does in the classroom. Pt found that her anxiety lessened after attending [employer] a second time. Pt hopes continuing to sit through her anxiety at [employer] will also help with the classroom. LCSW reviewed information on the cycle of anxiety with pt and how reducing avoidance can help reduce anxiety levels. Pt reported [employer] has also provided her with additional social support. LCSW and pt also discussed increasing pt's physical activity to help regulate emotions and anxiety levels.     Pt reported her friend recently told her that pt seems depressed and pt expressed concern over this. Pt reported she does not think she is depressed and asked LCSW if pt seems depressed. Pt denied experiencing a low mood or anhedonia. Pt reported normal motivation levels and stated she experiences enjoyment in her usual activities. Pt reported high levels of fatigue and feels that frequent worry exhausts her. LCSW explained that this is a symptom of EUGENE. Pt does not appear to be experiencing symptoms of depression. "     Pt is open to exploring medication options to help reduce anxiety symptoms. Pt is scheduled to meet with Dr. William for a medication consultation.         7/23/2025     3:00 PM 6/24/2025     2:18 PM 6/12/2025     3:08 PM 5/29/2025     1:21 PM 5/5/2025     3:04 PM 4/21/2025     3:00 PM 4/9/2025     3:02 PM   Results of the PHQ8   Little interest or pleasure in doing things Not at all Not at all Not at all Not at all Not at all Not at all Not at all   Feeling down, depressed, or hopeless Not at all Several days Not at all Not at all Not at all Not at all Several days   Trouble falling or staying asleep, or sleeping too much Several days Several days Several days Several days Several days More than half the days Nearly every day   Feeling tired or having little energy Several days Several days Several days Several days Several days More than half the days Nearly every day   Poor appetite or overeating Not at all Not at all Not at all Not at all Not at all Not at all Not at all   Feeling bad about yourself - or that you are a failure or have let yourself or your family down Not at all Several days Not at all Several days Several days Not at all Several days   Trouble concentrating on things, such as reading the newspaper or watching television Not at all Not at all Not at all Not at all Several days Not at all Not at all   Moving or speaking so slowly that other people could have noticed. Or the opposite - being so fidgety or restless that you have been moving around a lot more than usual Not at all Several days Not at all Several days Several days More than half the days Several days   Total Score  2 5 2 4 5 6 9           7/23/2025     2:59 PM 6/24/2025     2:18 PM 6/12/2025     3:07 PM   GAD7   1. Feeling nervous, anxious, or on edge? 1 2 1   2. Not being able to stop or control worrying? 1 3 1   3. Worrying too much about different things? 1 2 1   4. Trouble relaxing? 0 1 1   5. Being so restless that it is hard  to sit still? 0 0 1   6. Becoming easily annoyed or irritable? 1 1 1   7. Feeling afraid as if something awful might happen? 1 2 1   8. If you checked off any problems, how difficult have these problems made it for you to do your work, take care of things at home, or get along with other people? 1 1 1   EUGENE-7 Score 5  11  7        Patient-reported       Mental Status Exam  General Appearance:  unremarkable, age appropriate     Speech: normal tone, normal rate, normal pitch, normal volume         Level of Cooperation: cooperative        Thought Processes: normal and logical      Mood: steady        Thought Content: normal, no suicidality, no homicidality, delusions, or paranoia     Affect: congruent and appropriate    Orientation: Oriented x3     Memory: recent >  intact, remote >  intact     Attention Span & Concentration: intact     Fund of General Knowledge: intact and appropriate to age and level of education   Abstract Reasoning: interpretation of similarities was abstract   Judgment & Insight: good       Language:  intact       Treatment plan:  Target symptoms: anxiety   Why chosen therapy is appropriate versus another modality: relevant to diagnosis, patient responds to this modality, evidence based practice  Outcome monitoring methods: self-report, checklist/rating scale  Therapeutic intervention type: insight oriented psychotherapy, behavior modifying psychotherapy, supportive psychotherapy    Risk parameters:  Patient reports no suicidal ideation  Patient reports no homicidal ideation  Patient reports no self-injurious behavior  Patient reports no violent behavior    Verbal deficits: None    Patient's response to intervention:  The patient's response to intervention is accepting.    Progress toward goals and other mental status changes:  The patient's progress toward goals is good.    Patient advised to call [phone] or present the the nearest ED if they experience suicidal or homicidal ideation, plan or  intent.       Impression:  Pt presented to [facility] program with chief complaint of anxiety. Current anxiety symptoms include: increased irritability, racing thoughts, muscle tension, excessive worrying, restlessness, difficulty sleeping and panic attacks. Pt has difficulty leaving the house and feeling out of control. Pt is responding well to CBT and mindfulness based interventions.     Diagnosis:   1. Generalized anxiety disorder            Treatment Goals and Plan: Pt plans to continue individual therapy. Pt plans to practice mindfulness and utilize CBT worksheets.    Future treatment will utilize CBT, Mindfulness Techniques, and Solution-focused Therapy    Return to Clinic: 2 weeks      Length of Appointment: 60 Non face-to-face clinical time: 15

## 2025-08-01 ENCOUNTER — TELEPHONE (OUTPATIENT)
Dept: PSYCHIATRY | Facility: CLINIC | Age: 35
End: 2025-08-01
Payer: COMMERCIAL

## 2025-08-01 ENCOUNTER — PATIENT MESSAGE (OUTPATIENT)
Dept: BEHAVIORAL HEALTH | Facility: CLINIC | Age: 35
End: 2025-08-01
Payer: COMMERCIAL

## 2025-08-01 NOTE — TELEPHONE ENCOUNTER
Called patient regarding today's 1:30pm virtual visit.  No answer, left voicemail.  Also sent reminder text through Epic.

## 2025-08-06 ENCOUNTER — PATIENT MESSAGE (OUTPATIENT)
Dept: BEHAVIORAL HEALTH | Facility: CLINIC | Age: 35
End: 2025-08-06
Payer: COMMERCIAL

## 2025-08-06 ENCOUNTER — TELEPHONE (OUTPATIENT)
Dept: BEHAVIORAL HEALTH | Facility: CLINIC | Age: 35
End: 2025-08-06
Payer: COMMERCIAL

## 2025-08-08 ENCOUNTER — PATIENT MESSAGE (OUTPATIENT)
Dept: BEHAVIORAL HEALTH | Facility: CLINIC | Age: 35
End: 2025-08-08
Payer: COMMERCIAL

## 2025-08-11 ENCOUNTER — OFFICE VISIT (OUTPATIENT)
Dept: INTERNAL MEDICINE | Facility: CLINIC | Age: 35
End: 2025-08-11
Payer: COMMERCIAL

## 2025-08-11 ENCOUNTER — OFFICE VISIT (OUTPATIENT)
Dept: BEHAVIORAL HEALTH | Facility: CLINIC | Age: 35
End: 2025-08-11
Payer: COMMERCIAL

## 2025-08-11 ENCOUNTER — PATIENT MESSAGE (OUTPATIENT)
Dept: BEHAVIORAL HEALTH | Facility: CLINIC | Age: 35
End: 2025-08-11
Payer: MEDICAID

## 2025-08-11 VITALS
DIASTOLIC BLOOD PRESSURE: 86 MMHG | HEIGHT: 60 IN | HEART RATE: 105 BPM | WEIGHT: 157.19 LBS | SYSTOLIC BLOOD PRESSURE: 130 MMHG | BODY MASS INDEX: 30.86 KG/M2 | OXYGEN SATURATION: 99 %

## 2025-08-11 DIAGNOSIS — M89.9 SCAPULAR DYSFUNCTION: ICD-10-CM

## 2025-08-11 DIAGNOSIS — R53.83 OCCASIONAL FATIGUE: ICD-10-CM

## 2025-08-11 DIAGNOSIS — E55.9 VITAMIN D DEFICIENCY: ICD-10-CM

## 2025-08-11 DIAGNOSIS — L70.0 CYSTIC ACNE: ICD-10-CM

## 2025-08-11 DIAGNOSIS — F41.9 ANXIETY: Primary | ICD-10-CM

## 2025-08-11 DIAGNOSIS — F41.1 GENERALIZED ANXIETY DISORDER: Primary | ICD-10-CM

## 2025-08-11 DIAGNOSIS — M25.612 DECREASED RANGE OF MOTION OF LEFT SHOULDER: ICD-10-CM

## 2025-08-11 DIAGNOSIS — L70.0 ACNE VULGARIS: ICD-10-CM

## 2025-08-11 DIAGNOSIS — H93.A2 PULSATILE TINNITUS OF LEFT EAR: ICD-10-CM

## 2025-08-11 DIAGNOSIS — F41.8 TEST ANXIETY: ICD-10-CM

## 2025-08-11 PROCEDURE — 1159F MED LIST DOCD IN RCRD: CPT | Mod: CPTII,S$GLB,, | Performed by: FAMILY MEDICINE

## 2025-08-11 PROCEDURE — 3075F SYST BP GE 130 - 139MM HG: CPT | Mod: CPTII,S$GLB,, | Performed by: FAMILY MEDICINE

## 2025-08-11 PROCEDURE — 99214 OFFICE O/P EST MOD 30 MIN: CPT | Mod: S$GLB,,, | Performed by: FAMILY MEDICINE

## 2025-08-11 PROCEDURE — 3008F BODY MASS INDEX DOCD: CPT | Mod: CPTII,S$GLB,, | Performed by: FAMILY MEDICINE

## 2025-08-11 PROCEDURE — 99999 PR PBB SHADOW E&M-EST. PATIENT-LVL IV: CPT | Mod: PBBFAC,,, | Performed by: FAMILY MEDICINE

## 2025-08-11 PROCEDURE — G2211 COMPLEX E/M VISIT ADD ON: HCPCS | Mod: S$GLB,,, | Performed by: FAMILY MEDICINE

## 2025-08-11 PROCEDURE — 1160F RVW MEDS BY RX/DR IN RCRD: CPT | Mod: CPTII,S$GLB,, | Performed by: FAMILY MEDICINE

## 2025-08-11 PROCEDURE — 3079F DIAST BP 80-89 MM HG: CPT | Mod: CPTII,S$GLB,, | Performed by: FAMILY MEDICINE

## 2025-08-11 RX ORDER — ERGOCALCIFEROL 1.25 MG/1
50000 CAPSULE ORAL
Qty: 12 CAPSULE | Refills: 1 | Status: SHIPPED | OUTPATIENT
Start: 2025-08-11 | End: 2025-08-15 | Stop reason: SDUPTHER

## 2025-08-11 RX ORDER — ESCITALOPRAM OXALATE 5 MG/1
5 TABLET ORAL DAILY
Qty: 90 TABLET | Refills: 3 | Status: SHIPPED | OUTPATIENT
Start: 2025-08-11 | End: 2025-08-15 | Stop reason: SDUPTHER

## 2025-08-11 RX ORDER — TRETINOIN 1 MG/G
CREAM TOPICAL NIGHTLY
Qty: 45 G | Refills: 3 | Status: SHIPPED | OUTPATIENT
Start: 2025-08-11 | End: 2025-08-15 | Stop reason: SDUPTHER

## 2025-08-12 ENCOUNTER — PATIENT MESSAGE (OUTPATIENT)
Dept: INTERNAL MEDICINE | Facility: CLINIC | Age: 35
End: 2025-08-12
Payer: MEDICAID

## 2025-08-12 PROBLEM — H93.A2 PULSATILE TINNITUS OF LEFT EAR: Status: ACTIVE | Noted: 2025-08-12

## 2025-08-13 ENCOUNTER — LAB VISIT (OUTPATIENT)
Dept: LAB | Facility: HOSPITAL | Age: 35
End: 2025-08-13
Attending: FAMILY MEDICINE
Payer: COMMERCIAL

## 2025-08-13 DIAGNOSIS — R53.83 OCCASIONAL FATIGUE: ICD-10-CM

## 2025-08-13 DIAGNOSIS — E55.9 VITAMIN D DEFICIENCY: ICD-10-CM

## 2025-08-13 LAB
25(OH)D3+25(OH)D2 SERPL-MCNC: 20 NG/ML (ref 30–96)
ABSOLUTE EOSINOPHIL (OHS): 0.13 K/UL
ABSOLUTE MONOCYTE (OHS): 0.59 K/UL (ref 0.3–1)
ABSOLUTE NEUTROPHIL COUNT (OHS): 8.13 K/UL (ref 1.8–7.7)
BASOPHILS # BLD AUTO: 0.06 K/UL
BASOPHILS NFR BLD AUTO: 0.5 %
ERYTHROCYTE [DISTWIDTH] IN BLOOD BY AUTOMATED COUNT: 13.4 % (ref 11.5–14.5)
FERRITIN SERPL-MCNC: 56 NG/ML (ref 20–300)
HCT VFR BLD AUTO: 38.2 % (ref 37–48.5)
HGB BLD-MCNC: 12.2 GM/DL (ref 12–16)
IMM GRANULOCYTES # BLD AUTO: 0.09 K/UL (ref 0–0.04)
IMM GRANULOCYTES NFR BLD AUTO: 0.8 % (ref 0–0.5)
IRON SATN MFR SERPL: 17 % (ref 20–50)
IRON SERPL-MCNC: 65 UG/DL (ref 30–160)
LYMPHOCYTES # BLD AUTO: 2.43 K/UL (ref 1–4.8)
MCH RBC QN AUTO: 27.7 PG (ref 27–31)
MCHC RBC AUTO-ENTMCNC: 31.9 G/DL (ref 32–36)
MCV RBC AUTO: 87 FL (ref 82–98)
NUCLEATED RBC (/100WBC) (OHS): 0 /100 WBC
PLATELET # BLD AUTO: 320 K/UL (ref 150–450)
PMV BLD AUTO: 11.7 FL (ref 9.2–12.9)
RBC # BLD AUTO: 4.41 M/UL (ref 4–5.4)
RELATIVE EOSINOPHIL (OHS): 1.1 %
RELATIVE LYMPHOCYTE (OHS): 21.3 % (ref 18–48)
RELATIVE MONOCYTE (OHS): 5.2 % (ref 4–15)
RELATIVE NEUTROPHIL (OHS): 71.1 % (ref 38–73)
TIBC SERPL-MCNC: 391 UG/DL (ref 250–450)
TRANSFERRIN SERPL-MCNC: 264 MG/DL (ref 200–375)
VIT B12 SERPL-MCNC: 213 PG/ML (ref 210–950)
WBC # BLD AUTO: 11.43 K/UL (ref 3.9–12.7)

## 2025-08-13 PROCEDURE — 85025 COMPLETE CBC W/AUTO DIFF WBC: CPT

## 2025-08-13 PROCEDURE — 82728 ASSAY OF FERRITIN: CPT

## 2025-08-13 PROCEDURE — 36415 COLL VENOUS BLD VENIPUNCTURE: CPT

## 2025-08-13 PROCEDURE — 83540 ASSAY OF IRON: CPT

## 2025-08-13 PROCEDURE — 82306 VITAMIN D 25 HYDROXY: CPT

## 2025-08-13 PROCEDURE — 82607 VITAMIN B-12: CPT

## 2025-08-14 ENCOUNTER — RESULTS FOLLOW-UP (OUTPATIENT)
Dept: INTERNAL MEDICINE | Facility: CLINIC | Age: 35
End: 2025-08-14
Payer: COMMERCIAL

## 2025-08-14 DIAGNOSIS — F41.8 TEST ANXIETY: ICD-10-CM

## 2025-08-14 DIAGNOSIS — F41.9 ANXIETY: ICD-10-CM

## 2025-08-14 DIAGNOSIS — E55.9 VITAMIN D DEFICIENCY: ICD-10-CM

## 2025-08-14 DIAGNOSIS — L70.0 ACNE VULGARIS: ICD-10-CM

## 2025-08-15 RX ORDER — ERGOCALCIFEROL 1.25 MG/1
50000 CAPSULE ORAL
Qty: 12 CAPSULE | Refills: 1 | Status: SHIPPED | OUTPATIENT
Start: 2025-08-15 | End: 2026-02-11

## 2025-08-15 RX ORDER — TRETINOIN 1 MG/G
CREAM TOPICAL NIGHTLY
Qty: 45 G | Refills: 3 | Status: SHIPPED | OUTPATIENT
Start: 2025-08-15

## 2025-08-15 RX ORDER — ESCITALOPRAM OXALATE 5 MG/1
5 TABLET ORAL DAILY
Qty: 90 TABLET | Refills: 3 | Status: SHIPPED | OUTPATIENT
Start: 2025-08-15 | End: 2026-08-15

## 2025-08-18 ENCOUNTER — PATIENT MESSAGE (OUTPATIENT)
Dept: OTOLARYNGOLOGY | Facility: CLINIC | Age: 35
End: 2025-08-18

## 2025-08-19 ENCOUNTER — PATIENT MESSAGE (OUTPATIENT)
Dept: BEHAVIORAL HEALTH | Facility: CLINIC | Age: 35
End: 2025-08-19
Payer: MEDICAID

## 2025-08-19 ENCOUNTER — OFFICE VISIT (OUTPATIENT)
Dept: BEHAVIORAL HEALTH | Facility: CLINIC | Age: 35
End: 2025-08-19
Payer: MEDICAID

## 2025-08-19 DIAGNOSIS — F41.1 GENERALIZED ANXIETY DISORDER: Primary | ICD-10-CM

## 2025-08-19 PROCEDURE — 90837 PSYTX W PT 60 MINUTES: CPT | Mod: NDTC,,,

## 2025-08-20 ENCOUNTER — PATIENT MESSAGE (OUTPATIENT)
Dept: BEHAVIORAL HEALTH | Facility: CLINIC | Age: 35
End: 2025-08-20
Payer: MEDICAID

## 2025-08-22 ENCOUNTER — OFFICE VISIT (OUTPATIENT)
Dept: PSYCHIATRY | Facility: CLINIC | Age: 35
End: 2025-08-22
Payer: COMMERCIAL

## 2025-08-22 DIAGNOSIS — F51.05 INSOMNIA DUE TO MENTAL DISORDER: ICD-10-CM

## 2025-08-22 DIAGNOSIS — F40.01 PANIC DISORDER WITH AGORAPHOBIA: Primary | ICD-10-CM

## 2025-08-22 DIAGNOSIS — F41.1 GAD (GENERALIZED ANXIETY DISORDER): ICD-10-CM

## 2025-08-22 PROCEDURE — 90792 PSYCH DIAG EVAL W/MED SRVCS: CPT | Mod: 95,,, | Performed by: INTERNAL MEDICINE

## 2025-08-22 PROCEDURE — 1160F RVW MEDS BY RX/DR IN RCRD: CPT | Mod: CPTII,95,, | Performed by: INTERNAL MEDICINE

## 2025-08-22 PROCEDURE — 1159F MED LIST DOCD IN RCRD: CPT | Mod: CPTII,95,, | Performed by: INTERNAL MEDICINE

## 2025-08-26 ENCOUNTER — TELEPHONE (OUTPATIENT)
Dept: OTOLARYNGOLOGY | Facility: CLINIC | Age: 35
End: 2025-08-26
Payer: MEDICAID

## 2025-08-26 ENCOUNTER — OFFICE VISIT (OUTPATIENT)
Dept: OTOLARYNGOLOGY | Facility: CLINIC | Age: 35
End: 2025-08-26
Payer: COMMERCIAL

## 2025-08-26 ENCOUNTER — PATIENT MESSAGE (OUTPATIENT)
Dept: OTOLARYNGOLOGY | Facility: CLINIC | Age: 35
End: 2025-08-26

## 2025-08-26 VITALS — DIASTOLIC BLOOD PRESSURE: 68 MMHG | SYSTOLIC BLOOD PRESSURE: 134 MMHG

## 2025-08-26 DIAGNOSIS — Z13.9 SCREENING PROCEDURE: Primary | ICD-10-CM

## 2025-08-26 DIAGNOSIS — R42 DIZZINESS AND GIDDINESS: ICD-10-CM

## 2025-08-26 DIAGNOSIS — H93.A2 PULSATILE TINNITUS OF LEFT EAR: Primary | ICD-10-CM

## 2025-08-26 DIAGNOSIS — H90.3 ASYMMETRICAL SENSORINEURAL HEARING LOSS: ICD-10-CM

## 2025-08-26 PROCEDURE — 3078F DIAST BP <80 MM HG: CPT | Mod: CPTII,S$GLB,, | Performed by: PHYSICIAN ASSISTANT

## 2025-08-26 PROCEDURE — 1160F RVW MEDS BY RX/DR IN RCRD: CPT | Mod: CPTII,S$GLB,, | Performed by: PHYSICIAN ASSISTANT

## 2025-08-26 PROCEDURE — 3075F SYST BP GE 130 - 139MM HG: CPT | Mod: CPTII,S$GLB,, | Performed by: PHYSICIAN ASSISTANT

## 2025-08-26 PROCEDURE — 99999 PR PBB SHADOW E&M-EST. PATIENT-LVL II: CPT | Mod: PBBFAC,,, | Performed by: PHYSICIAN ASSISTANT

## 2025-08-26 PROCEDURE — 99215 OFFICE O/P EST HI 40 MIN: CPT | Mod: S$GLB,,, | Performed by: PHYSICIAN ASSISTANT

## 2025-08-26 PROCEDURE — 1159F MED LIST DOCD IN RCRD: CPT | Mod: CPTII,S$GLB,, | Performed by: PHYSICIAN ASSISTANT

## 2025-08-27 PROBLEM — F41.1 GAD (GENERALIZED ANXIETY DISORDER): Status: ACTIVE | Noted: 2024-11-12

## 2025-08-27 PROBLEM — F40.01 PANIC DISORDER WITH AGORAPHOBIA: Status: ACTIVE | Noted: 2025-08-27

## 2025-09-03 ENCOUNTER — OFFICE VISIT (OUTPATIENT)
Dept: BEHAVIORAL HEALTH | Facility: CLINIC | Age: 35
End: 2025-09-03
Payer: COMMERCIAL

## 2025-09-03 DIAGNOSIS — F40.01 PANIC DISORDER WITH AGORAPHOBIA: Primary | ICD-10-CM

## 2025-09-03 DIAGNOSIS — F41.1 GENERALIZED ANXIETY DISORDER: ICD-10-CM

## 2025-09-03 PROCEDURE — 90837 PSYTX W PT 60 MINUTES: CPT | Mod: 95,,,
